# Patient Record
Sex: MALE | Race: BLACK OR AFRICAN AMERICAN | Employment: OTHER | URBAN - METROPOLITAN AREA
[De-identification: names, ages, dates, MRNs, and addresses within clinical notes are randomized per-mention and may not be internally consistent; named-entity substitution may affect disease eponyms.]

---

## 2022-04-01 ENCOUNTER — APPOINTMENT (OUTPATIENT)
Dept: GENERAL RADIOLOGY | Age: 87
End: 2022-04-01
Payer: COMMERCIAL

## 2022-04-01 ENCOUNTER — APPOINTMENT (OUTPATIENT)
Dept: CT IMAGING | Age: 87
End: 2022-04-01
Payer: COMMERCIAL

## 2022-04-01 ENCOUNTER — HOSPITAL ENCOUNTER (EMERGENCY)
Age: 87
Discharge: NURSING, REHAB OR LONG-TERM CARE | End: 2022-04-01
Attending: EMERGENCY MEDICINE
Payer: COMMERCIAL

## 2022-04-01 VITALS
HEART RATE: 58 BPM | SYSTOLIC BLOOD PRESSURE: 128 MMHG | DIASTOLIC BLOOD PRESSURE: 80 MMHG | TEMPERATURE: 98.3 F | OXYGEN SATURATION: 100 % | RESPIRATION RATE: 14 BRPM

## 2022-04-01 DIAGNOSIS — S09.90XA INJURY OF HEAD, INITIAL ENCOUNTER: Primary | ICD-10-CM
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
## 2022-04-01 PROCEDURE — 70450 CT HEAD/BRAIN W/O DYE: CPT

## 2022-04-01 PROCEDURE — 73502 X-RAY EXAM HIP UNI 2-3 VIEWS: CPT

## 2022-04-01 PROCEDURE — 99285 EMERGENCY DEPT VISIT HI MDM: CPT

## 2022-04-01 PROCEDURE — 72125 CT NECK SPINE W/O DYE: CPT

## 2022-04-01 PROCEDURE — 71045 X-RAY EXAM CHEST 1 VIEW: CPT

## 2022-04-01 PROCEDURE — 73030 X-RAY EXAM OF SHOULDER: CPT

## 2022-04-01 RX ORDER — DONEPEZIL HYDROCHLORIDE 10 MG/1
10 TABLET, FILM COATED ORAL NIGHTLY
COMMUNITY

## 2022-04-01 RX ORDER — SENNA PLUS 8.6 MG/1
1 TABLET ORAL DAILY PRN
COMMUNITY

## 2022-04-01 RX ORDER — SENNA PLUS 8.6 MG/1
2 TABLET ORAL DAILY PRN
Status: ON HOLD | COMMUNITY
End: 2022-07-12 | Stop reason: HOSPADM

## 2022-04-01 RX ORDER — CHOLECALCIFEROL (VITAMIN D3) 25 MCG
TABLET,CHEWABLE ORAL DAILY
COMMUNITY

## 2022-04-01 RX ORDER — M-VIT,TX,IRON,MINS/CALC/FOLIC 27MG-0.4MG
1 TABLET ORAL DAILY
COMMUNITY

## 2022-04-01 RX ORDER — SERTRALINE HYDROCHLORIDE 100 MG/1
100 TABLET, FILM COATED ORAL DAILY
COMMUNITY

## 2022-04-01 RX ORDER — LIDOCAINE 50 MG/G
OINTMENT TOPICAL EVERY 8 HOURS PRN
COMMUNITY

## 2022-04-01 RX ORDER — DOCUSATE SODIUM 100 MG/1
200 CAPSULE, LIQUID FILLED ORAL NIGHTLY
COMMUNITY

## 2022-04-01 RX ORDER — ACETAMINOPHEN 500 MG
500 TABLET ORAL NIGHTLY
COMMUNITY

## 2022-04-01 RX ORDER — ACETAMINOPHEN 325 MG/1
650 TABLET ORAL EVERY 6 HOURS PRN
COMMUNITY

## 2022-04-01 RX ORDER — TAMSULOSIN HYDROCHLORIDE 0.4 MG/1
0.4 CAPSULE ORAL DAILY
COMMUNITY

## 2022-04-01 RX ORDER — FUROSEMIDE 40 MG/1
40 TABLET ORAL DAILY
Status: ON HOLD | COMMUNITY
End: 2022-07-12 | Stop reason: HOSPADM

## 2022-04-01 RX ORDER — METOPROLOL SUCCINATE 25 MG/1
12.5 TABLET, EXTENDED RELEASE ORAL DAILY
Status: ON HOLD | COMMUNITY
End: 2022-07-12 | Stop reason: HOSPADM

## 2022-04-01 RX ORDER — OYSTER SHELL CALCIUM WITH VITAMIN D 500; 200 MG/1; [IU]/1
1 TABLET, FILM COATED ORAL 2 TIMES DAILY
COMMUNITY

## 2022-04-01 RX ORDER — SERTRALINE HYDROCHLORIDE 25 MG/1
25 TABLET, FILM COATED ORAL DAILY
Status: ON HOLD | COMMUNITY
End: 2022-07-12 | Stop reason: HOSPADM

## 2022-04-01 RX ORDER — FINASTERIDE 5 MG/1
5 TABLET, FILM COATED ORAL DAILY
COMMUNITY

## 2022-04-01 RX ORDER — ASPIRIN 81 MG/1
81 TABLET, CHEWABLE ORAL DAILY
COMMUNITY

## 2022-04-01 ASSESSMENT — PAIN DESCRIPTION - LOCATION: LOCATION: BUTTOCKS

## 2022-04-01 ASSESSMENT — ENCOUNTER SYMPTOMS
RHINORRHEA: 0
ABDOMINAL PAIN: 0
VOMITING: 0
COUGH: 0
SHORTNESS OF BREATH: 0
DIARRHEA: 0
NAUSEA: 0

## 2022-04-01 ASSESSMENT — PAIN - FUNCTIONAL ASSESSMENT: PAIN_FUNCTIONAL_ASSESSMENT: 0-10

## 2022-04-01 ASSESSMENT — PAIN DESCRIPTION - PAIN TYPE: TYPE: CHRONIC PAIN

## 2022-04-01 ASSESSMENT — PAIN SCALES - GENERAL: PAINLEVEL_OUTOF10: 10

## 2022-04-01 NOTE — ED PROVIDER NOTES
[facility] [facility]        Pt Name: Haylee Cortez  MRN: [id number]  [place] 6/27/1926  Date of evaluation: 4/1/2022  Provider: Jackson Carty PA-C  PCP: Jamel Issa MD  Note Started: 3:02 AM EDT        I have seen and evaluated this patient with my supervising physician Som Estrada MD.    [address] [place] [address]       Chief Complaint   Patient presents with   Bel Kang     From [facility] via EMS following unwitnessed mechanical fall while trying to reach call light. PT vomited afterwards, unsure how long down. C/O pain on bottom from pressure sores. Abrasions noted to right cheek and shoulder. Cried out when right leg moved per EMS, abrasions noted. Hx of dementia. HISTORY OF PRESENT ILLNESS   (Location, Timing/Onset, Context/Setting, Quality, Duration, Modifying Factors, Severity, Associated Signs and Symptoms)  Note limiting factors. Chief Complaint: Brooklynn De La Torre is a 80 y.o. male who presents to the emergency department today from [facility] facility for evaluation for a fall. The fall was unwitnessed. The patient states that around 10 PM, he states that he was reaching for his call light, however he states that his call he was on the floor, he states that this caused him to roll out of bed, and fell on his right side. The patient states that he did hit his head however there is no loss of consciousness. The patient states that he did have an episode of emesis after he fell, there was no bilious emesis, hematemesis or coffee-ground emesis. The patient denies any abdominal pain, nausea at this time. No diarrhea. The patient denies any symptoms prior to his fall he states he simply rolled out of bed trying to reach his call light. Patient is not on any blood thinners.   Patient is complaining of pain to his bottom but he states that this is from his pressure sores, he states that this is not a new pain.  He denies any hip pain, neck pain, or shoulder pain from the fall. He has no headaches. No numbness, tingling or weakness. Patient otherwise has no complaints or injuries. Nursing Notes were all reviewed and agreed with or any disagreements were addressed in the HPI. REVIEW OF SYSTEMS    (2-9 systems for level 4, 10 or more for level 5)     Review of Systems   Constitutional: Negative for activity change, appetite change, chills and fever. HENT: Negative for congestion and rhinorrhea. Respiratory: Negative for cough and shortness of breath. Cardiovascular: Negative for chest pain. Gastrointestinal: Negative for abdominal pain, diarrhea, nausea and vomiting. Genitourinary: Negative for difficulty urinating, dysuria and hematuria. Neurological: Negative for weakness and numbness. Positives and Pertinent negatives as per HPI. Except as noted above in the ROS, all other systems were reviewed and negative. PAST MEDICAL HISTORY     Past Medical History:   Diagnosis Date    Age-related physical debility     Anxiety disorder due to known physiological condition     Anxiety disorder, unspecified     Benign prostatic hyperplasia without lower urinary tract symptoms     Bradycardia, unspecified     Cardiomyopathy ([address]     Diabetes mellitus ([address]     Dizziness and giddiness     Dysphagia, oropharyngeal phase     Hyperlipidemia     Hypertension     Major depressive disorder, recurrent, moderate (HCC)     Mild cognitive impairment, so stated     Muscle weakness (generalized)     Unspecified dementia without behavioral disturbance (HCC)     Unspecified systolic (congestive) heart failure (HCC)     Unsteadiness on feet          SURGICAL HISTORY   History reviewed. No pertinent surgical history.       CURRENTMEDICATIONS       Previous Medications    ACETAMINOPHEN (TYLENOL) 325 MG TABLET    Take 650 mg by mouth every 6 hours as needed for Pain or Fever    ACETAMINOPHEN (TYLENOL) 500 MG TABLET    Take 500 mg by mouth nightly Indications: Nerve Disease    ASPIRIN 81 MG CHEWABLE TABLET    Take 81 mg by mouth daily    CALCIUM-VITAMIN D (OSCAL-500) 500-200 MG-UNIT PER TABLET    Take 1 tablet by mouth 2 times daily    CYANOCOBALAMIN (B-12) 1000 MCG CAPS    Take by mouth daily    DOCUSATE SODIUM (COLACE) 100 MG CAPSULE    Take 200 mg by mouth nightly Indications: Constipation    DONEPEZIL (ARICEPT) 10 MG TABLET    Take 10 mg by mouth nightly Indications: Decline in Cognition due to a Brain Disease    FEXOFENADINE (ALLEGRA) 30 MG DISSOLVABLE TABLET    Take 60 mg by mouth every 8 hours as needed (cough)    FINASTERIDE (PROSCAR) 5 MG TABLET    Take 5 mg by mouth daily    FUROSEMIDE (LASIX) 40 MG TABLET    Take 40 mg by mouth daily    LIDOCAINE (XYLOCAINE) 5 % OINTMENT    Apply topically every 8 hours as needed for Pain Apply to feet/toes as needed.     METOPROLOL SUCCINATE (TOPROL XL) 25 MG EXTENDED RELEASE TABLET    Take 12.5 mg by mouth daily Hold for SBP <110 or HR <60    MINERAL OIL-HYDROPHILIC PETROLATUM (AQUAPHOR) OINTMENT    Apply topically nightly Apply to feet and legs topically at bedtime    MULTIPLE VITAMINS-MINERALS (THERAPEUTIC MULTIVITAMIN-MINERALS) TABLET    Take 1 tablet by mouth daily    POLYETHYL GLYCOL-PROPYL GLYCOL 0.4-0.3 % (SYSTANE) 0.4-0.3 % OPHTHALMIC SOLUTION    1 drop 4 times daily Instill one drop in both eyes daily    SENNA (SENOKOT) 8.6 MG TABLET    Take 1 tablet by mouth daily as needed for Constipation    SENNA (SENOKOT) 8.6 MG TABLET    Take 2 tablets by mouth daily as needed for Constipation    SERTRALINE (ZOLOFT) 100 MG TABLET    Take 100 mg by mouth daily    SERTRALINE (ZOLOFT) 25 MG TABLET    Take 25 mg by mouth daily    SITAGLIPTIN (JANUVIA) 25 MG TABLET    Take 25 mg by mouth daily    TAMSULOSIN (FLOMAX) 0.4 MG CAPSULE    Take 0.4 mg by mouth daily    VITAMIN D (CHOLECALCIFEROL) 25 MCG (1000 UT) TABS TABLET    Take 1,000 Units by mouth daily ALLERGIES     Amitriptyline, Gabapentin, Mercurochrome [merbromin], Mercury, and Merthiolate glycerite [thimerosal]    FAMILYHISTORY     History reviewed. No pertinent family history. SOCIAL HISTORY       Social History     Tobacco Use    Smoking status: Unknown If Ever Smoked    Smokeless tobacco: Not on file   Vaping Use    Vaping Use: Never used   Substance Use Topics    Alcohol use: Not Currently    Drug use: Not Currently       SCREENINGS    Joselin Coma Scale  Eye Opening: Spontaneous  Best Verbal Response: Oriented  Best Motor Response: Obeys commands  Joselin Coma Scale Score: 15        PHYSICAL EXAM    (up to 7 for level 4, 8 or more for level 5)     ED Triage Vitals [04/01/22 0227]   BP Temp Temp Source Pulse Resp SpO2 Height Weight   113/82 98.3 °F (36.8 °C) Oral 62 22 98 % -- --       Physical Exam  Vitals and nursing note reviewed. Constitutional:       Appearance: He is well-developed. He is not diaphoretic. HENT:      Head: Normocephalic and atraumatic. Right Ear: External ear normal.      Left Ear: External ear normal.      Nose: Nose normal.   Eyes:      General:         Right eye: No discharge. Left eye: No discharge. Neck:      Trachea: No tracheal deviation. Cardiovascular:      Rate and Rhythm: Normal rate and regular rhythm. Pulmonary:      Effort: Pulmonary effort is normal. No respiratory distress. Breath sounds: Normal breath sounds. No wheezing or rales. Abdominal:      General: Bowel sounds are normal. There is no distension. Palpations: Abdomen is soft. Tenderness: There is no abdominal tenderness. There is no guarding. Musculoskeletal:         General: Normal range of motion. Cervical back: Normal range of motion and neck supple. Comments: There is no midline spinal tenderness    There is abrasion noted to the right shoulder. The patient has no reproducible tenderness noted over the right shoulder.   Swelling to fall which occurred around 10 PM.  He states that he was trying to get out of bed, and rolled out of bed, which is currently. He did hit his head. There is no loss of consciousness, but he did have an episode of emesis    On examination, no focal neurological deficits    CT of head and cervical spine are obtained and negative    Patient has no reproducible tenderness noted over the right shoulder however he does have an abrasion, x-ray is pending    Patient has no reproducible tenderness noted to the right hip related pain on the right side, will obtain x-ray*pending. This marks in my shift, please see attending note for details and final disposition      FINAL IMPRESSION      1. Injury of head, initial encounter    2. Fall, initial encounter          DISPOSITION/PLAN   DISPOSITION        PATIENT REFERRED TO:  No follow-up provider specified.     DISCHARGE MEDICATIONS:  New Prescriptions    No medications on file       DISCONTINUED MEDICATIONS:  Discontinued Medications    No medications on file              (Please note that portions of this note were completed with a voice recognition program.  Efforts were made to edit the dictations but occasionally words are mis-transcribed.)    Deborah Dill PA-C (electronically signed)            Deborah Dill PA-C  04/01/22 1722

## 2022-04-01 NOTE — ED PROVIDER NOTES
[facility]    Physician Attestation    Pt Name: Janey Deleon  MRN: [id number]  [place] 6/27/1926  Date of evaluation: 4/1/22        Physician Note:    I havepersonally performed and/or participated in the history, exam and medical decision making and agree with all pertinent clinical information. I have also reviewed and agree with the past medical, family and social historyunless otherwise noted. I have personally performed a face to face diagnostic evaluation onthis patient. I have reviewed the mid-levels findings and agree. History: rolled out of bed landing on his right side has no complaints at this time he is hard of hearing      REVIEW OF SYSTEMS    Constitutional:  Denies fever, chills, or weakness   Eyes:  Denies vision changes  HENT:  Denies sore throat or neck pain   Respiratory:  Denies cough or shortness of breath   Cardiovascular:  Denies chest pain  GI:  Denies abdominal pain, nausea, vomiting, or diarrhea   Musculoskeletal:  Denies back pain   Skin: no rash or vesicles   Neurologic:  no headache weakness focal    Lymphatic:  no swollen  nodes   Psychiatric: no si or hs thoughts     All systems negative except as marked. General Appearance:  Alert, cooperative, no distress, appears stated age. Head:  Normocephalic, without obvious abnormality, atraumatic. Eyes:  conjunctiva/corneas clear, EOM's intact. Sclera anicteric. ENT: Mucous membranes moist.   Neck: Supple, symmetrical, trachea midline, no adenopathy. No jugular venous distention. Lungs:   No Respiratory Distress. no rales  rhonchi rub   Chest Wall:  Nontender  no deformity   Heart:  Rsr no murmer gallop    Abdomen:   Soft nontender no organomegally    Extremities:  Full range of motion. no deformity   Rt shoulder has good range of motion slightly larger than the left   Pulses: Equal  upper and lower    Skin:  No rashes or lesions to exposed skin. Ointment on the sacral region but no breakdown of the skin is appreciated   Neurologic: Alert and oriented X 3. Motor grossly normal.  Speech clear. CT of the head and neck unremarkable x-rays of the right shoulder show degenerative changes x-ray of the hip shows degenerative changes chest x-ray unremarkable patient will be discharged back to the nursing home    1. Injury of head, initial encounter    2.  Fall, initial encounter          DISPOSITION/PLAN  PATIENT REFERRED TO:  Ardena Moritz, MD  [address] [place] [address] Road [address]  [phone]          DISCHARGE MEDICATIONS:  New Prescriptions    No medications on file         MD Veena Stevenson MD  04/01/22 1559

## 2022-07-07 ENCOUNTER — HOSPITAL ENCOUNTER (INPATIENT)
Age: 87
LOS: 5 days | Discharge: NURSING, REHAB OR LONG-TERM CARE | DRG: 682 | End: 2022-07-12
Attending: EMERGENCY MEDICINE | Admitting: INTERNAL MEDICINE
Payer: COMMERCIAL

## 2022-07-07 ENCOUNTER — APPOINTMENT (OUTPATIENT)
Dept: GENERAL RADIOLOGY | Age: 87
DRG: 682 | End: 2022-07-07
Payer: COMMERCIAL

## 2022-07-07 ENCOUNTER — APPOINTMENT (OUTPATIENT)
Dept: CT IMAGING | Age: 87
DRG: 682 | End: 2022-07-07
Payer: COMMERCIAL

## 2022-07-07 DIAGNOSIS — N17.9 AKI (ACUTE KIDNEY INJURY) (HCC): ICD-10-CM

## 2022-07-07 DIAGNOSIS — R77.8 ELEVATED TROPONIN: ICD-10-CM

## 2022-07-07 DIAGNOSIS — E86.0 DEHYDRATION: Primary | ICD-10-CM

## 2022-07-07 DIAGNOSIS — G89.29 OTHER CHRONIC PAIN: ICD-10-CM

## 2022-07-07 DIAGNOSIS — K59.00 CONSTIPATION, UNSPECIFIED CONSTIPATION TYPE: ICD-10-CM

## 2022-07-07 PROBLEM — Z99.2 ACUTE RENAL FAILURE SUPERIMPOSED ON CHRONIC KIDNEY DISEASE, ON CHRONIC DIALYSIS (HCC): Status: ACTIVE | Noted: 2022-07-07

## 2022-07-07 PROBLEM — N18.9 ACUTE RENAL FAILURE SUPERIMPOSED ON CHRONIC KIDNEY DISEASE, ON CHRONIC DIALYSIS (HCC): Status: ACTIVE | Noted: 2022-07-07

## 2022-07-07 LAB
A/G RATIO: 1 (ref 1.1–2.2)
ALBUMIN SERPL-MCNC: 4.1 G/DL (ref 3.4–5)
ALP BLD-CCNC: 52 U/L (ref 40–129)
ALT SERPL-CCNC: 9 U/L (ref 10–40)
ANION GAP SERPL CALCULATED.3IONS-SCNC: 19 MMOL/L (ref 3–16)
AST SERPL-CCNC: 24 U/L (ref 15–37)
BACTERIA: ABNORMAL /HPF
BASE EXCESS VENOUS: 2.7 MMOL/L (ref -3–3)
BASOPHILS ABSOLUTE: 0 K/UL (ref 0–0.2)
BASOPHILS RELATIVE PERCENT: 0.2 %
BILIRUB SERPL-MCNC: 0.7 MG/DL (ref 0–1)
BILIRUBIN URINE: NEGATIVE
BLOOD, URINE: NEGATIVE
BUN BLDV-MCNC: 90 MG/DL (ref 7–20)
CALCIUM SERPL-MCNC: 9.9 MG/DL (ref 8.3–10.6)
CARBOXYHEMOGLOBIN: 3.1 % (ref 0–1.5)
CHLORIDE BLD-SCNC: 102 MMOL/L (ref 99–110)
CLARITY: CLEAR
CO2: 27 MMOL/L (ref 21–32)
COLOR: YELLOW
CREAT SERPL-MCNC: 3.4 MG/DL (ref 0.8–1.3)
EOSINOPHILS ABSOLUTE: 0 K/UL (ref 0–0.6)
EOSINOPHILS RELATIVE PERCENT: 0.3 %
EPITHELIAL CELLS, UA: ABNORMAL /HPF (ref 0–5)
GFR AFRICAN AMERICAN: 20
GFR NON-AFRICAN AMERICAN: 17
GLUCOSE BLD-MCNC: 159 MG/DL (ref 70–99)
GLUCOSE URINE: NEGATIVE MG/DL
HCO3 VENOUS: 27.3 MMOL/L (ref 23–29)
HCT VFR BLD CALC: 41.2 % (ref 40.5–52.5)
HEMOGLOBIN: 13.1 G/DL (ref 13.5–17.5)
HYALINE CASTS: ABNORMAL /LPF (ref 0–2)
KETONES, URINE: ABNORMAL MG/DL
LACTIC ACID, SEPSIS: 2.4 MMOL/L (ref 0.4–1.9)
LACTIC ACID, SEPSIS: 2.5 MMOL/L (ref 0.4–1.9)
LEUKOCYTE ESTERASE, URINE: ABNORMAL
LYMPHOCYTES ABSOLUTE: 1.8 K/UL (ref 1–5.1)
LYMPHOCYTES RELATIVE PERCENT: 21.8 %
MCH RBC QN AUTO: 30.7 PG (ref 26–34)
MCHC RBC AUTO-ENTMCNC: 31.8 G/DL (ref 31–36)
MCV RBC AUTO: 96.6 FL (ref 80–100)
METHEMOGLOBIN VENOUS: 0.1 %
MICROSCOPIC EXAMINATION: YES
MONOCYTES ABSOLUTE: 0.8 K/UL (ref 0–1.3)
MONOCYTES RELATIVE PERCENT: 10 %
NEUTROPHILS ABSOLUTE: 5.7 K/UL (ref 1.7–7.7)
NEUTROPHILS RELATIVE PERCENT: 67.7 %
NITRITE, URINE: NEGATIVE
O2 CONTENT, VEN: 18 VOL %
O2 SAT, VEN: 99 %
O2 THERAPY: ABNORMAL
PCO2, VEN: 41.4 MMHG (ref 40–50)
PDW BLD-RTO: 15.9 % (ref 12.4–15.4)
PH UA: 5 (ref 5–8)
PH VENOUS: 7.43 (ref 7.35–7.45)
PLATELET # BLD: 116 K/UL (ref 135–450)
PMV BLD AUTO: 9 FL (ref 5–10.5)
PO2, VEN: 116 MMHG (ref 25–40)
POTASSIUM REFLEX MAGNESIUM: 4.7 MMOL/L (ref 3.5–5.1)
PROTEIN UA: ABNORMAL MG/DL
RBC # BLD: 4.27 M/UL (ref 4.2–5.9)
RBC UA: ABNORMAL /HPF (ref 0–4)
SODIUM BLD-SCNC: 148 MMOL/L (ref 136–145)
SPECIFIC GRAVITY UA: 1.01 (ref 1–1.03)
TCO2 CALC VENOUS: 64 MMOL/L
TOTAL PROTEIN: 8.4 G/DL (ref 6.4–8.2)
TROPONIN: 0.22 NG/ML
TROPONIN: 0.3 NG/ML
URINE REFLEX TO CULTURE: ABNORMAL
URINE TYPE: ABNORMAL
UROBILINOGEN, URINE: 0.2 E.U./DL
WBC # BLD: 8.4 K/UL (ref 4–11)
WBC UA: ABNORMAL /HPF (ref 0–5)

## 2022-07-07 PROCEDURE — 93005 ELECTROCARDIOGRAM TRACING: CPT | Performed by: EMERGENCY MEDICINE

## 2022-07-07 PROCEDURE — 6360000002 HC RX W HCPCS: Performed by: INTERNAL MEDICINE

## 2022-07-07 PROCEDURE — 80053 COMPREHEN METABOLIC PANEL: CPT

## 2022-07-07 PROCEDURE — 99285 EMERGENCY DEPT VISIT HI MDM: CPT

## 2022-07-07 PROCEDURE — 6360000002 HC RX W HCPCS: Performed by: EMERGENCY MEDICINE

## 2022-07-07 PROCEDURE — 2580000003 HC RX 258: Performed by: INTERNAL MEDICINE

## 2022-07-07 PROCEDURE — 96375 TX/PRO/DX INJ NEW DRUG ADDON: CPT

## 2022-07-07 PROCEDURE — 85025 COMPLETE CBC W/AUTO DIFF WBC: CPT

## 2022-07-07 PROCEDURE — 74176 CT ABD & PELVIS W/O CONTRAST: CPT

## 2022-07-07 PROCEDURE — 81001 URINALYSIS AUTO W/SCOPE: CPT

## 2022-07-07 PROCEDURE — 84153 ASSAY OF PSA TOTAL: CPT

## 2022-07-07 PROCEDURE — 70450 CT HEAD/BRAIN W/O DYE: CPT

## 2022-07-07 PROCEDURE — 2580000003 HC RX 258: Performed by: EMERGENCY MEDICINE

## 2022-07-07 PROCEDURE — 1200000000 HC SEMI PRIVATE

## 2022-07-07 PROCEDURE — 84484 ASSAY OF TROPONIN QUANT: CPT

## 2022-07-07 PROCEDURE — 82803 BLOOD GASES ANY COMBINATION: CPT

## 2022-07-07 PROCEDURE — 6370000000 HC RX 637 (ALT 250 FOR IP): Performed by: INTERNAL MEDICINE

## 2022-07-07 PROCEDURE — 71045 X-RAY EXAM CHEST 1 VIEW: CPT

## 2022-07-07 PROCEDURE — 96360 HYDRATION IV INFUSION INIT: CPT

## 2022-07-07 PROCEDURE — 96374 THER/PROPH/DIAG INJ IV PUSH: CPT

## 2022-07-07 PROCEDURE — 96361 HYDRATE IV INFUSION ADD-ON: CPT

## 2022-07-07 PROCEDURE — 83605 ASSAY OF LACTIC ACID: CPT

## 2022-07-07 RX ORDER — VITAMIN B COMPLEX
1000 TABLET ORAL DAILY
Status: DISCONTINUED | OUTPATIENT
Start: 2022-07-07 | End: 2022-07-12 | Stop reason: HOSPADM

## 2022-07-07 RX ORDER — FINASTERIDE 5 MG/1
5 TABLET, FILM COATED ORAL DAILY
Status: DISCONTINUED | OUTPATIENT
Start: 2022-07-07 | End: 2022-07-12 | Stop reason: HOSPADM

## 2022-07-07 RX ORDER — ACETAMINOPHEN 325 MG/1
650 TABLET ORAL EVERY 6 HOURS PRN
Status: DISCONTINUED | OUTPATIENT
Start: 2022-07-07 | End: 2022-07-12 | Stop reason: HOSPADM

## 2022-07-07 RX ORDER — POLYVINYL ALCOHOL 14 MG/ML
1 SOLUTION/ DROPS OPHTHALMIC 4 TIMES DAILY
Status: DISCONTINUED | OUTPATIENT
Start: 2022-07-07 | End: 2022-07-12 | Stop reason: HOSPADM

## 2022-07-07 RX ORDER — TAMSULOSIN HYDROCHLORIDE 0.4 MG/1
0.4 CAPSULE ORAL DAILY
Status: DISCONTINUED | OUTPATIENT
Start: 2022-07-07 | End: 2022-07-12 | Stop reason: HOSPADM

## 2022-07-07 RX ORDER — CETIRIZINE HYDROCHLORIDE 10 MG/1
5 TABLET ORAL DAILY
Status: DISCONTINUED | OUTPATIENT
Start: 2022-07-07 | End: 2022-07-11

## 2022-07-07 RX ORDER — M-VIT,TX,IRON,MINS/CALC/FOLIC 27MG-0.4MG
1 TABLET ORAL DAILY
Status: DISCONTINUED | OUTPATIENT
Start: 2022-07-07 | End: 2022-07-12 | Stop reason: HOSPADM

## 2022-07-07 RX ORDER — MIDAZOLAM HYDROCHLORIDE 2 MG/2ML
1 INJECTION, SOLUTION INTRAMUSCULAR; INTRAVENOUS ONCE
Status: COMPLETED | OUTPATIENT
Start: 2022-07-07 | End: 2022-07-07

## 2022-07-07 RX ORDER — SODIUM CHLORIDE, SODIUM LACTATE, POTASSIUM CHLORIDE, AND CALCIUM CHLORIDE .6; .31; .03; .02 G/100ML; G/100ML; G/100ML; G/100ML
1000 INJECTION, SOLUTION INTRAVENOUS ONCE
Status: COMPLETED | OUTPATIENT
Start: 2022-07-07 | End: 2022-07-07

## 2022-07-07 RX ORDER — OYSTER SHELL CALCIUM WITH VITAMIN D 500; 200 MG/1; [IU]/1
1 TABLET, FILM COATED ORAL 2 TIMES DAILY
Status: DISCONTINUED | OUTPATIENT
Start: 2022-07-07 | End: 2022-07-09

## 2022-07-07 RX ORDER — LANOLIN ALCOHOL/MO/W.PET/CERES
1000 CREAM (GRAM) TOPICAL DAILY
Status: DISCONTINUED | OUTPATIENT
Start: 2022-07-07 | End: 2022-07-12 | Stop reason: HOSPADM

## 2022-07-07 RX ORDER — ASPIRIN 81 MG/1
81 TABLET, CHEWABLE ORAL DAILY
Status: DISCONTINUED | OUTPATIENT
Start: 2022-07-07 | End: 2022-07-12 | Stop reason: HOSPADM

## 2022-07-07 RX ORDER — ACETAMINOPHEN 500 MG
500 TABLET ORAL NIGHTLY
Status: DISCONTINUED | OUTPATIENT
Start: 2022-07-07 | End: 2022-07-12 | Stop reason: HOSPADM

## 2022-07-07 RX ORDER — SENNA PLUS 8.6 MG/1
1 TABLET ORAL NIGHTLY
Status: DISCONTINUED | OUTPATIENT
Start: 2022-07-07 | End: 2022-07-12 | Stop reason: HOSPADM

## 2022-07-07 RX ORDER — ALOGLIPTIN 6.25 MG/1
6.25 TABLET, FILM COATED ORAL DAILY
Status: DISCONTINUED | OUTPATIENT
Start: 2022-07-07 | End: 2022-07-12 | Stop reason: HOSPADM

## 2022-07-07 RX ORDER — HALOPERIDOL 5 MG/ML
2 INJECTION INTRAMUSCULAR ONCE
Status: COMPLETED | OUTPATIENT
Start: 2022-07-07 | End: 2022-07-07

## 2022-07-07 RX ORDER — DONEPEZIL HYDROCHLORIDE 5 MG/1
10 TABLET, FILM COATED ORAL NIGHTLY
Status: DISCONTINUED | OUTPATIENT
Start: 2022-07-07 | End: 2022-07-12 | Stop reason: HOSPADM

## 2022-07-07 RX ORDER — SKIN PROTECTANT 44 G/100G
OINTMENT TOPICAL NIGHTLY
Status: DISCONTINUED | OUTPATIENT
Start: 2022-07-07 | End: 2022-07-12 | Stop reason: HOSPADM

## 2022-07-07 RX ORDER — DOCUSATE SODIUM 100 MG/1
200 CAPSULE, LIQUID FILLED ORAL NIGHTLY
Status: DISCONTINUED | OUTPATIENT
Start: 2022-07-07 | End: 2022-07-12 | Stop reason: HOSPADM

## 2022-07-07 RX ORDER — LIDOCAINE 50 MG/G
OINTMENT TOPICAL EVERY 8 HOURS PRN
Status: DISCONTINUED | OUTPATIENT
Start: 2022-07-07 | End: 2022-07-12 | Stop reason: HOSPADM

## 2022-07-07 RX ORDER — HEPARIN SODIUM 5000 [USP'U]/ML
5000 INJECTION, SOLUTION INTRAVENOUS; SUBCUTANEOUS EVERY 8 HOURS SCHEDULED
Status: DISCONTINUED | OUTPATIENT
Start: 2022-07-07 | End: 2022-07-12 | Stop reason: HOSPADM

## 2022-07-07 RX ORDER — DEXTROSE AND SODIUM CHLORIDE 5; .45 G/100ML; G/100ML
INJECTION, SOLUTION INTRAVENOUS CONTINUOUS
Status: DISCONTINUED | OUTPATIENT
Start: 2022-07-07 | End: 2022-07-08

## 2022-07-07 RX ORDER — METOPROLOL SUCCINATE 25 MG/1
12.5 TABLET, EXTENDED RELEASE ORAL DAILY
Status: DISCONTINUED | OUTPATIENT
Start: 2022-07-07 | End: 2022-07-11

## 2022-07-07 RX ADMIN — HALOPERIDOL LACTATE 2 MG: 5 INJECTION, SOLUTION INTRAMUSCULAR at 14:40

## 2022-07-07 RX ADMIN — SODIUM CHLORIDE, POTASSIUM CHLORIDE, SODIUM LACTATE AND CALCIUM CHLORIDE 1000 ML: 600; 310; 30; 20 INJECTION, SOLUTION INTRAVENOUS at 08:03

## 2022-07-07 RX ADMIN — MIDAZOLAM HYDROCHLORIDE 1 MG: 1 INJECTION, SOLUTION INTRAMUSCULAR; INTRAVENOUS at 14:41

## 2022-07-07 RX ADMIN — HEPARIN SODIUM 5000 UNITS: 5000 INJECTION INTRAVENOUS; SUBCUTANEOUS at 20:49

## 2022-07-07 RX ADMIN — POLYVINYL ALCOHOL 1 DROP: 14 SOLUTION/ DROPS OPHTHALMIC at 20:51

## 2022-07-07 RX ADMIN — SODIUM CHLORIDE, POTASSIUM CHLORIDE, SODIUM LACTATE AND CALCIUM CHLORIDE 1000 ML: 600; 310; 30; 20 INJECTION, SOLUTION INTRAVENOUS at 10:43

## 2022-07-07 RX ADMIN — DEXTROSE AND SODIUM CHLORIDE: 5; 450 INJECTION, SOLUTION INTRAVENOUS at 15:47

## 2022-07-07 ASSESSMENT — PAIN - FUNCTIONAL ASSESSMENT: PAIN_FUNCTIONAL_ASSESSMENT: NONE - DENIES PAIN

## 2022-07-07 ASSESSMENT — PAIN SCALES - GENERAL
PAINLEVEL_OUTOF10: 0

## 2022-07-07 ASSESSMENT — PAIN SCALES - WONG BAKER
WONGBAKER_NUMERICALRESPONSE: 0
WONGBAKER_NUMERICALRESPONSE: 0

## 2022-07-07 NOTE — PLAN OF CARE
Problem: Skin/Tissue Integrity  Goal: Absence of new skin breakdown  Description: 1. Monitor for areas of redness and/or skin breakdown  2. Assess vascular access sites hourly  3. Every 4-6 hours minimum:  Change oxygen saturation probe site  4. Every 4-6 hours:  If on nasal continuous positive airway pressure, respiratory therapy assess nares and determine need for appliance change or resting period.   Outcome: Progressing     Problem: Pain  Goal: Verbalizes/displays adequate comfort level or baseline comfort level  Outcome: Progressing     Problem: Safety - Adult  Goal: Free from fall injury  Outcome: Progressing     Problem: ABCDS Injury Assessment  Goal: Absence of physical injury  Outcome: Progressing

## 2022-07-07 NOTE — ED NOTES
Open wound on coccyx. Surrounded area non-blanchable.  Mepliex in place     [place], [address] [place] [address]  07/07/22 9053

## 2022-07-07 NOTE — ED NOTES
Pt became agitated. Took off his pulse oximeter. Pt screaming \"I cant pee for hours\" (pt has [place]). Pt's wife at bedside. MD notified of pt condition.  Ordered medications sarita Daly RN  07/07/22 7257

## 2022-07-07 NOTE — ED PROVIDER NOTES
[facility] PROVIDER NOTE    Patient Identification  Pt Name: Richelle Isbell  MRN: [id number]  [place] 6/27/1926  Date of evaluation: 7/7/2022  Provider: Gilles Sharp MD  PCP: Tino Blandon MD    Chief Complaint  Other (Pt srrived via [facility] from [facility] home w c/o abnormal labs.)      HPI  History provided by patient   This is a 80 y.o. male who presents to the ED for not eating for the past 4 days. He is weaker than normal.  Blood work was performed yesterday and showed PAMELA and hypernatremia therefore he was sent here. Patient has pain over his buttocks which he attributes to bedsore. He mainly complains of dry mouth. No pain anywhere else. No chest pain. No shortness of breath. History seems limited due to clinical condition. ROS  12 systems reviewed, pertinent positives/negatives per HPI otherwise noted to be negative. I have reviewed the following nursing documentation:  Allergies: Amitriptyline, Gabapentin, Mercurochrome [merbromin], Mercury, and Merthiolate glycerite [thimerosal]    Past medical history:   Past Medical History:   Diagnosis Date    Age-related physical debility     Anxiety disorder due to known physiological condition     Anxiety disorder, unspecified     Benign prostatic hyperplasia without lower urinary tract symptoms     Bradycardia, unspecified     Cardiomyopathy ([address]     Diabetes mellitus ([address]     Dizziness and giddiness     Dysphagia, oropharyngeal phase     Hyperlipidemia     Hypertension     Major depressive disorder, recurrent, moderate (HCC)     Mild cognitive impairment, so stated     Muscle weakness (generalized)     Unspecified dementia without behavioral disturbance (HCC)     Unspecified systolic (congestive) heart failure (HCC)     Unsteadiness on feet      Past surgical history: No past surgical history on file.     Home medications:   Previous Medications    ACETAMINOPHEN (TYLENOL) 325 MG TABLET    Take 650 mg by mouth every 6 hours as needed for Pain or Fever    ACETAMINOPHEN (TYLENOL) 500 MG TABLET    Take 500 mg by mouth nightly Indications: Nerve Disease    ASPIRIN 81 MG CHEWABLE TABLET    Take 81 mg by mouth daily    CALCIUM-VITAMIN D (OSCAL-500) 500-200 MG-UNIT PER TABLET    Take 1 tablet by mouth 2 times daily    CYANOCOBALAMIN (B-12) 1000 MCG CAPS    Take by mouth daily    DOCUSATE SODIUM (COLACE) 100 MG CAPSULE    Take 200 mg by mouth nightly Indications: Constipation    DONEPEZIL (ARICEPT) 10 MG TABLET    Take 10 mg by mouth nightly Indications: Decline in Cognition due to a Brain Disease    FEXOFENADINE (ALLEGRA) 30 MG DISSOLVABLE TABLET    Take 60 mg by mouth every 8 hours as needed (cough)    FINASTERIDE (PROSCAR) 5 MG TABLET    Take 5 mg by mouth daily    FUROSEMIDE (LASIX) 40 MG TABLET    Take 40 mg by mouth daily    LIDOCAINE (XYLOCAINE) 5 % OINTMENT    Apply topically every 8 hours as needed for Pain Apply to feet/toes as needed.     METOPROLOL SUCCINATE (TOPROL XL) 25 MG EXTENDED RELEASE TABLET    Take 12.5 mg by mouth daily Hold for SBP <110 or HR <60    MINERAL OIL-HYDROPHILIC PETROLATUM (AQUAPHOR) OINTMENT    Apply topically nightly Apply to feet and legs topically at bedtime    MULTIPLE VITAMINS-MINERALS (THERAPEUTIC MULTIVITAMIN-MINERALS) TABLET    Take 1 tablet by mouth daily    POLYETHYL GLYCOL-PROPYL GLYCOL 0.4-0.3 % (SYSTANE) 0.4-0.3 % OPHTHALMIC SOLUTION    1 drop 4 times daily Instill one drop in both eyes daily    SENNA (SENOKOT) 8.6 MG TABLET    Take 1 tablet by mouth daily as needed for Constipation    SENNA (SENOKOT) 8.6 MG TABLET    Take 2 tablets by mouth daily as needed for Constipation    SERTRALINE (ZOLOFT) 100 MG TABLET    Take 100 mg by mouth daily    SERTRALINE (ZOLOFT) 25 MG TABLET    Take 25 mg by mouth daily    SITAGLIPTIN (JANUVIA) 25 MG TABLET    Take 25 mg by mouth daily    TAMSULOSIN (FLOMAX) 0.4 MG CAPSULE    Take 0.4 mg by mouth daily    VITAMIN D (CHOLECALCIFEROL) 25 MCG (1000 UT) TABS TABLET    Take 1,000 Units by mouth daily       Social history:  reports previous alcohol use. He reports previous drug use. Family history:  No family history on file. Exam  ED Triage Vitals [07/07/22 0724]   BP Temp Temp src Heart Rate Resp SpO2 Height Weight   95/69 98.1 °F (36.7 °C) -- (!) 121 14 97 % -- --     Nursing note and vitals reviewed. Constitutional: In no acute distress  HENT:      Head: Normocephalic      Ears: External ears normal.      Nose: Nose normal.     Mouth: Membrane mucosa very dry  Eyes: No discharge. Neck: Supple. Trachea midline. Cardiovascular: Regular rate. Warm extremities  Pulmonary/Chest: Effort normal. No respiratory distress. Abdominal: Soft. No distension. Nontender  : Deferred  Rectal: Deferred   Musculoskeletal: Moves all extremities. No gross deformity. Neurological: Alert and oriented to person. Face symmetric. Speech is clear. Skin: Warm and dry. Psychiatric: Normal mood and affect. Behavior is normal.    Procedures      EKG  The Ekg interpreted by me in the absence of a cardiologist shows. Narrow qrs  Regular  No p waves  Possible junctional rhythm. No specific ST changes appreciated.   No prior EKG for comparison      Radiology  CT ABDOMEN PELVIS WO CONTRAST Additional Contrast? None   Final Result   No evidence of obstructive uropathy. Large stool ball at the rectum; correlate for fecal impaction. Nodular contour of the liver surface; correlate for cirrhosis. Gallbladder sludge. CT HEAD WO CONTRAST   Final Result   Atrophy and moderate small vessel ischemic disease. RECOMMENDATIONS:   Unavailable         XR CHEST PORTABLE   Final Result   No acute airspace disease.              Labs  Results for orders placed or performed during the hospital encounter of 07/07/22   CBC with Auto Differential   Result Value Ref Range    WBC 8.4 4.0 - 11.0 K/uL    RBC 4.27 4.20 - 5.90 M/uL Hemoglobin 13.1 (L) 13.5 - 17.5 g/dL    Hematocrit 41.2 40.5 - 52.5 %    MCV 96.6 80.0 - 100.0 fL    MCH 30.7 26.0 - 34.0 pg    MCHC 31.8 31.0 - 36.0 g/dL    RDW 15.9 (H) 12.4 - 15.4 %    Platelets 983 (L) 386 - 450 K/uL    MPV 9.0 5.0 - 10.5 fL    Neutrophils % 67.7 %    Lymphocytes % 21.8 %    Monocytes % 10.0 %    Eosinophils % 0.3 %    Basophils % 0.2 %    Neutrophils Absolute 5.7 1.7 - 7.7 K/uL    Lymphocytes Absolute 1.8 1.0 - 5.1 K/uL    Monocytes Absolute 0.8 0.0 - 1.3 K/uL    Eosinophils Absolute 0.0 0.0 - 0.6 K/uL    Basophils Absolute 0.0 0.0 - 0.2 K/uL   Comprehensive Metabolic Panel w/ Reflex to MG   Result Value Ref Range    Sodium 148 (H) 136 - 145 mmol/L    Potassium reflex Magnesium 4.7 3.5 - 5.1 mmol/L    Chloride 102 99 - 110 mmol/L    CO2 27 21 - 32 mmol/L    Anion Gap 19 (H) 3 - 16    Glucose 159 (H) 70 - 99 mg/dL    BUN 90 (HH) 7 - 20 mg/dL    CREATININE 3.4 (H) 0.8 - 1.3 mg/dL    GFR Non-African American 17 (A) >60    GFR  20 (A) >60    Calcium 9.9 8.3 - 10.6 mg/dL    Total Protein 8.4 (H) 6.4 - 8.2 g/dL    Albumin 4.1 3.4 - 5.0 g/dL    Albumin/Globulin Ratio 1.0 (L) 1.1 - 2.2    Total Bilirubin 0.7 0.0 - 1.0 mg/dL    Alkaline Phosphatase 52 40 - 129 U/L    ALT 9 (L) 10 - 40 U/L    AST 24 15 - 37 U/L   Troponin   Result Value Ref Range    Troponin 0.30 (H) <0.01 ng/mL   Urinalysis with Reflex to Culture    Specimen: Urine   Result Value Ref Range    Color, UA Yellow Straw/Yellow    Clarity, UA Clear Clear    Glucose, Ur Negative Negative mg/dL    Bilirubin Urine Negative Negative    Ketones, Urine TRACE (A) Negative mg/dL    Specific Gravity, UA 1.015 1.005 - 1.030    Blood, Urine Negative Negative    pH, UA 5.0 5.0 - 8.0    Protein, UA TRACE (A) Negative mg/dL    Urobilinogen, Urine 0.2 <2.0 E.U./dL    Nitrite, Urine Negative Negative    Leukocyte Esterase, Urine TRACE (A) Negative    Microscopic Examination YES     Urine Type NotGiven     Urine Reflex to Culture Not Indicated Lactate, Sepsis   Result Value Ref Range    Lactic Acid, Sepsis 2.4 (H) 0.4 - 1.9 mmol/L   Lactate, Sepsis   Result Value Ref Range    Lactic Acid, Sepsis 2.5 (H) 0.4 - 1.9 mmol/L   Blood Gas, Venous   Result Value Ref Range    pH, Pat[place] 7.428 7.350 - 7.450    pCO2, Pat[place] 41.4 40.0 - 50.0 mmHg    pO2, Pat[place] 116.0 (H) 25.0 - 40.0 mmHg    HCO3, Venous 27.3 23.0 - 29.0 mmol/L    Base Excess, Pato 2.7 -3.0 - 3.0 mmol/L    O2 Sat, Pat[place] 99 Not Established %    Carboxyhemoglobin 3.1 (H) 0.0 - 1.5 %    MetHgb, Pato 0.1 <1.5 %    TC02 (Calc), Pato 64 Not Established mmol/L    O2 Content, Pato 18 Not Established VOL %    O2 Therapy Unknown    Microscopic Urinalysis   Result Value Ref Range    Hyaline Casts, UA 3-5 (A) 0 - 2 /LPF    WBC, UA 0-2 0 - 5 /HPF    RBC, UA None seen 0 - 4 /HPF    Epithelial Cells, UA 2-5 0 - 5 /HPF    Bacteria, UA None Seen None Seen /HPF   Troponin   Result Value Ref Range    Troponin 0.22 (H) <0.01 ng/mL   EKG 12 Lead   Result Value Ref Range    Ventricular Rate 119 BPM    Atrial Rate 93 BPM    QRS Duration 106 ms    Q-T Interval 344 ms    QTc Calculation (Bazett) 483 ms    R Axis -66 degrees    T Axis 95 degrees    Diagnosis       Accelerated Junctional rhythm with retrograde conductionLeft axis deviationLow voltage QRSInferior infarct , age undeterminedCannot rule out Anterior infarct , age undeterminedAbnormal ECG       Screenings   Joselin Coma Scale  Eye Opening: Spontaneous  Best Verbal Response: Confused  Best Motor Response: Obeys commands  Joselin Coma Scale Score: 14       MDM and ED Course  This is a 80 y.o. male who presents to the ED for generalized weakness, not eating or drinking. Very dehydrated on exam.  Will give IV fluids. He is tachycardic. Outside lab work shows PAMELA with hypernatremia. Likely prerenal.  No abdominal pain or tenderness to indicate obstructive nephropathy. Will check for UTI.   Anticipate admission to hospitalist service.    ------------    No signs of infection found.  Did find PAMELA. BUN also elevated. Also has elevated troponin. May be secondary to PAMELA. I repeated this and troponin is downtrending. He has no chest pain or shortness of breath. No acute ischemic changes on EKG. CT shows evidence of constipation. He has no abdominal pain    -------------    Discussed with Dr. Pamella Urbano. Patient admitted. [unfilled]    Is this patient to be included in the SEP-1 Core Measure due to severe sepsis or septic shock? No   Exclusion criteria - the patient is NOT to be included for SEP-1 Core Measure due to: Infection is not suspected        Final Impression  1. Dehydration    2. PAMELA (acute kidney injury) ([facility]    3. Elevated troponin    4. Constipation, unspecified constipation type        Blood pressure 102/75, pulse (!) 119, temperature 98.1 °F (36.7 °C), resp. rate 18, SpO2 100 %. Disposition:  DISPOSITION        Patient Referrals:  No follow-up provider specified. Discharge Medications:  New Prescriptions    No medications on file       Discontinued Medications:  Discontinued Medications    No medications on file       This chart was generated using the [address] [place] [address] dictation system. I created this record but it may contain dictation errors given the limitations of this technology.         Mora Gutierrez MD  07/07/22 0609

## 2022-07-07 NOTE — ED NOTES
Pt oral mucous membrane exteremly dry with orange patches. Teach coated in thick decay. Oral care provided.      Margoth Mcgarth RN  07/07/22 1257

## 2022-07-08 ENCOUNTER — APPOINTMENT (OUTPATIENT)
Dept: ULTRASOUND IMAGING | Age: 87
DRG: 682 | End: 2022-07-08
Payer: COMMERCIAL

## 2022-07-08 PROBLEM — F01.50 VASCULAR DEMENTIA (HCC): Status: ACTIVE | Noted: 2022-07-08

## 2022-07-08 PROBLEM — E43 SEVERE PROTEIN-CALORIE MALNUTRITION (HCC): Status: ACTIVE | Noted: 2022-07-08

## 2022-07-08 PROBLEM — R77.8 ELEVATED TROPONIN: Status: ACTIVE | Noted: 2022-07-08

## 2022-07-08 PROBLEM — R62.51 FAILURE TO THRIVE (CHILD): Status: ACTIVE | Noted: 2022-07-08

## 2022-07-08 PROBLEM — R97.20 ELEVATED PSA: Status: ACTIVE | Noted: 2022-07-08

## 2022-07-08 PROBLEM — I95.9 HYPOTENSION: Status: ACTIVE | Noted: 2022-07-08

## 2022-07-08 LAB
ALBUMIN SERPL-MCNC: 3.6 G/DL (ref 3.4–5)
ANION GAP SERPL CALCULATED.3IONS-SCNC: 14 MMOL/L (ref 3–16)
ANION GAP SERPL CALCULATED.3IONS-SCNC: 15 MMOL/L (ref 3–16)
BUN BLDV-MCNC: 88 MG/DL (ref 7–20)
BUN BLDV-MCNC: 89 MG/DL (ref 7–20)
CALCIUM SERPL-MCNC: 9.5 MG/DL (ref 8.3–10.6)
CALCIUM SERPL-MCNC: 9.5 MG/DL (ref 8.3–10.6)
CHLORIDE BLD-SCNC: 106 MMOL/L (ref 99–110)
CHLORIDE BLD-SCNC: 109 MMOL/L (ref 99–110)
CHLORIDE URINE RANDOM: <20 MMOL/L
CO2: 26 MMOL/L (ref 21–32)
CO2: 27 MMOL/L (ref 21–32)
CREAT SERPL-MCNC: 2.8 MG/DL (ref 0.8–1.3)
CREAT SERPL-MCNC: 2.9 MG/DL (ref 0.8–1.3)
CREATININE URINE: 80.4 MG/DL (ref 39–259)
EKG ATRIAL RATE: 93 BPM
EKG DIAGNOSIS: NORMAL
EKG Q-T INTERVAL: 344 MS
EKG QRS DURATION: 106 MS
EKG QTC CALCULATION (BAZETT): 483 MS
EKG R AXIS: -66 DEGREES
EKG T AXIS: 95 DEGREES
EKG VENTRICULAR RATE: 119 BPM
GFR AFRICAN AMERICAN: 25
GFR AFRICAN AMERICAN: 26
GFR NON-AFRICAN AMERICAN: 20
GFR NON-AFRICAN AMERICAN: 21
GLUCOSE BLD-MCNC: 204 MG/DL (ref 70–99)
GLUCOSE BLD-MCNC: 213 MG/DL (ref 70–99)
PHOSPHORUS: 3.6 MG/DL (ref 2.5–4.9)
POTASSIUM SERPL-SCNC: 3.8 MMOL/L (ref 3.5–5.1)
POTASSIUM SERPL-SCNC: 3.9 MMOL/L (ref 3.5–5.1)
POTASSIUM, UR: 37.5 MMOL/L
PROSTATE SPECIFIC ANTIGEN: 48.88 NG/ML (ref 0–4)
SODIUM BLD-SCNC: 147 MMOL/L (ref 136–145)
SODIUM BLD-SCNC: 150 MMOL/L (ref 136–145)
SODIUM URINE: 32 MMOL/L
UREA NITROGEN, UR: 962.4 MG/DL (ref 800–1666)

## 2022-07-08 PROCEDURE — 87449 NOS EACH ORGANISM AG IA: CPT

## 2022-07-08 PROCEDURE — 2580000003 HC RX 258: Performed by: INTERNAL MEDICINE

## 2022-07-08 PROCEDURE — 92526 ORAL FUNCTION THERAPY: CPT

## 2022-07-08 PROCEDURE — 93010 ELECTROCARDIOGRAM REPORT: CPT | Performed by: INTERNAL MEDICINE

## 2022-07-08 PROCEDURE — 82436 ASSAY OF URINE CHLORIDE: CPT

## 2022-07-08 PROCEDURE — 36415 COLL VENOUS BLD VENIPUNCTURE: CPT

## 2022-07-08 PROCEDURE — 76770 US EXAM ABDO BACK WALL COMP: CPT

## 2022-07-08 PROCEDURE — 92610 EVALUATE SWALLOWING FUNCTION: CPT

## 2022-07-08 PROCEDURE — 84540 ASSAY OF URINE/UREA-N: CPT

## 2022-07-08 PROCEDURE — 6370000000 HC RX 637 (ALT 250 FOR IP): Performed by: INTERNAL MEDICINE

## 2022-07-08 PROCEDURE — 80069 RENAL FUNCTION PANEL: CPT

## 2022-07-08 PROCEDURE — 82570 ASSAY OF URINE CREATININE: CPT

## 2022-07-08 PROCEDURE — 1200000000 HC SEMI PRIVATE

## 2022-07-08 PROCEDURE — 6360000002 HC RX W HCPCS: Performed by: INTERNAL MEDICINE

## 2022-07-08 PROCEDURE — 84300 ASSAY OF URINE SODIUM: CPT

## 2022-07-08 PROCEDURE — 84133 ASSAY OF URINE POTASSIUM: CPT

## 2022-07-08 RX ORDER — DEXTROSE AND SODIUM CHLORIDE 5; .45 G/100ML; G/100ML
INJECTION, SOLUTION INTRAVENOUS CONTINUOUS
Status: DISPENSED | OUTPATIENT
Start: 2022-07-08 | End: 2022-07-11

## 2022-07-08 RX ADMIN — SERTRALINE 100 MG: 50 TABLET, FILM COATED ORAL at 09:29

## 2022-07-08 RX ADMIN — ACETAMINOPHEN 500 MG: 500 TABLET ORAL at 22:14

## 2022-07-08 RX ADMIN — Medication 1 TABLET: at 22:15

## 2022-07-08 RX ADMIN — TAMSULOSIN HYDROCHLORIDE 0.4 MG: 0.4 CAPSULE ORAL at 09:29

## 2022-07-08 RX ADMIN — DEXTROSE AND SODIUM CHLORIDE: 5; 450 INJECTION, SOLUTION INTRAVENOUS at 22:10

## 2022-07-08 RX ADMIN — POLYVINYL ALCOHOL 1 DROP: 14 SOLUTION/ DROPS OPHTHALMIC at 12:23

## 2022-07-08 RX ADMIN — POLYVINYL ALCOHOL 1 DROP: 14 SOLUTION/ DROPS OPHTHALMIC at 22:16

## 2022-07-08 RX ADMIN — FINASTERIDE 5 MG: 5 TABLET, FILM COATED ORAL at 09:30

## 2022-07-08 RX ADMIN — POLYVINYL ALCOHOL 1 DROP: 14 SOLUTION/ DROPS OPHTHALMIC at 16:19

## 2022-07-08 RX ADMIN — DEXTROSE AND SODIUM CHLORIDE: 5; 450 INJECTION, SOLUTION INTRAVENOUS at 12:21

## 2022-07-08 RX ADMIN — SENNOSIDES 8.6 MG: 8.6 TABLET, FILM COATED ORAL at 22:16

## 2022-07-08 RX ADMIN — HEPARIN SODIUM 5000 UNITS: 5000 INJECTION INTRAVENOUS; SUBCUTANEOUS at 22:12

## 2022-07-08 RX ADMIN — HEPARIN SODIUM 5000 UNITS: 5000 INJECTION INTRAVENOUS; SUBCUTANEOUS at 05:26

## 2022-07-08 RX ADMIN — DOCUSATE SODIUM 200 MG: 100 CAPSULE, LIQUID FILLED ORAL at 22:14

## 2022-07-08 RX ADMIN — DEXTROSE AND SODIUM CHLORIDE: 5; 450 INJECTION, SOLUTION INTRAVENOUS at 13:03

## 2022-07-08 RX ADMIN — HEPARIN SODIUM 5000 UNITS: 5000 INJECTION INTRAVENOUS; SUBCUTANEOUS at 12:23

## 2022-07-08 RX ADMIN — DONEPEZIL HYDROCHLORIDE 10 MG: 5 TABLET, FILM COATED ORAL at 22:14

## 2022-07-08 RX ADMIN — ASPIRIN 81 MG: 81 TABLET, CHEWABLE ORAL at 09:29

## 2022-07-08 RX ADMIN — POLYVINYL ALCOHOL 1 DROP: 14 SOLUTION/ DROPS OPHTHALMIC at 07:53

## 2022-07-08 ASSESSMENT — PAIN SCALES - GENERAL
PAINLEVEL_OUTOF10: 0

## 2022-07-08 ASSESSMENT — PAIN SCALES - WONG BAKER

## 2022-07-08 NOTE — PROGRESS NOTES
Facility/Department: [address] ONCOLOGY  Initial Assessment  DYSPHAGIA BEDSIDE SWALLOW EVALUATION     Patient: Allan Soloriosin   : 1926   MRN: [id number]      Evaluation Date: 2022   Admitting Diagnosis: Dehydration [E86.0]  Elevated troponin [R77.8]  PAMELA (acute kidney injury) ([facility] [address] [N17.9]  Constipation, unspecified constipation type [K59.00]  Acute renal failure superimposed on chronic kidney disease, on chronic dialysis, unspecified acute renal failure type ([facility] [address] [N17.9, N18.9, Z99.2]  Pain: Did not state                                                       H&P:   History provided by patient   This is a 80 y.o. male who presents to the ED for not eating for the past 4 days. He is weaker than normal.  Blood work was performed yesterday and showed PAMELA and hypernatremia therefore he was sent here. Patient has pain over his buttocks which he attributes to bedsore. He mainly complains of dry mouth. No pain anywhere else. No chest pain. No shortness of breath. History seems limited due to clinical condition. Imaging:  Chest X-ray:  2022  No acute airspace disease. Head CT:    Atrophy and moderate small vessel ischemic disease. History/Prior Level of Function:   Living Status: Pt is a resident at [facility]. Per RN report, pt was on a softer diet and required adaptive feeding equipment. Prior Dysphagia History: No other known dysphagia history   Reason for referral: SLP evaluation orders received due to limited PO intake  and cognitive state ; Per RN pt refusing meals and medications     Dysphagia Impressions/Diagnosis: Oropharyngeal Dysphagia   Pt was seen sitting upright in bed on room air. He was pleasant and able to follow commands with modifications (hard of hearing); required simplification and repetition. His oral cavity was severely dry and coated with mucous.  Pt reported this to be uncomfortable and painful to the level of his throat which is preventing him from eating/drinking. He had many missing teeth. Partial lower dentures were placed after completing significant oral care (toothettes, mouthwash, oral rinse). His oral motor strength and coordination was moderately impaired with reduced labial seal, greater on the left side. Oral phase was characterized by impaired labial seal resulting in anterior bolus loss of thin and puree consistencies. Pt was unable to adequately control nor masticate soft solids and resulted in expulsion. He demonstrated premature bolus loss with thin liquids in addition to a delayed audible swallow. No overt clinical s/s of aspiration were assessed however pt demonstrates high risk for reduced airway protection. Oral control improved with placement of straw in small sips and extended time for manipulation of puree consistencies. Pt presents at risk for aspiration due to his oral weakness, poor oral hygiene, and co morbidities. Strict aspiration precautions should be implemented in addition to feeding assistance. Recommend dysphagia I puree solids and thin liquids. Recommend frequent oral care, specifically after meals. Pt would benefit from dental consultation upon d/c. Recommended Diet and Intervention 7/8/2022:  Diet Solids Recommendation:  Dysphagia I Puree  Liquid Consistency Recommendation: Thin liquids  - via straw  Recommended form of Meds: Meds in puree  -one at a time    * Feeding assistance *Aspiration precautions        Compensatory Swallowing Strategies:  Check for pocketing of food L, Check for pocketing of food R, Upright as possible with all PO intake , Assist Feed , Eat/feed slowly    SHORT TERM DYSPHAGIA GOALS/PLAN OF CARE: Speech therapy for dysphagia tx 3-5 times per week during acute care stay.     Pt will functionally tolerate recommended diet with no overt clinical s/s of aspiration   Pt will demonstrate understanding of aspiration risk and precautions via education/demonstration with occasional prompting  Pt will advance to least restrictive diet as indicated     Dysphagia Therapeutic Intervention:  Oral Care, Diet Tolerance Monitoring , Patient/Family Education , Therapeutic Trials with SLP     Discharge Recommendations: Recommend ongoing SLP for dysphagia therapy upon discharge from hospital     Patient Positioning: Upright in bed     Current Diet Level (prior to evaluation): Regular texture diet  Thin liquids      Respiratory Status:   [x]Room Air   []O2 via nasal cannula   []Other:    Dentition:  []Adequate  [x]Dentures   [x]Missing Many Teeth  [x]Edentulous  []Other:    Baseline Vocal Quality:  []Normal  []Dysphonic   []Aphonic   [x]Hoarse  []Wet  [x]Weak  []Other:    Volitional Cough:  []Strong  [x]Weak  []Wet  []Absent  []Congested  []Other:    Volitional Swallow:   []Absent   [x]Delayed     []Adequate     []Required use of drink     Oral Mechanism Exam:  []WFL []Mild   [x] Moderate  []Severe  []To be assessed  Impaired:   [x]Left side      []Right side    [x]Labial ROM/Coordination    [x]Labial Symmetry   [x]Lingual ROM/Coordination   []Lingual Symmetry  []Gag  []Other:     Oral Phase: []WFL []Mild   [x] Moderate  [x]Severe  []To be assessed   [x]Impaired/Prolonged Mastication:   []Oral Holding:   [x]Spillage Left:   [x]Spillage Right:  []Pocketing Left:   []Pocketing Right:   [x]Decreased Anterior to Posterior Transit:   [x]Suspected Premature Bolus Loss:   [x]Lingual/Palatal Residue:   []Other:     Pharyngeal Phase: []WFL [x]Mild   [x] Moderate  []Severe  []To be assessed   [x]Delayed Swallow:   []Suspected Pharyngeal Pooling:   [x]Decreased Laryngeal Elevation:   []Absent Swallow:  []Wet Vocal Quality:   []Throat Clearing-Immediate:   []Throat Clearing-Delayed:   []Cough-Immediate:   []Cough-Delayed:  []Change in Vital Signs:  []Suspected Delayed Pharyngeal Clearing:  []Other:       Eating Assistance:  []Independent  []Setup or clean-up assistance   [] Supervision or touching assistance   [] Partial or moderate assistance   [x] Substantial or maximal assistance  [] Dependent       EDUCATION:   Provided education regarding role of SLP, results of assessment, recommendations and general speech pathology plan of care. [] Pt verbalized understanding and agreement   [x] Pt requires ongoing learning - RN present for education and verbalized understanding of recommendations   [] No evidence of comprehension     If patient discharges prior to next visit, this note will serve as discharge. Timed Code Minutes:  0  Total Treatment Minutes: 43 minutes     Electronically signed by:    Kun Arvizu M.A.  CCC-SLP S.P. [id number]  Speech-Language Pathologist   7/8/2022 9:47 AM

## 2022-07-08 NOTE — PROGRESS NOTES
Patient Active Problem List   Diagnosis    Acute renal failure superimposed on chronic kidney disease, on chronic dialysis ([facility] [address]    Failure to thrive (child)    Severe protein-calorie malnutrition ([facility] [address]    [facility]    Elevated troponin    Elevated PSA    Hypotension   H&P dictated

## 2022-07-08 NOTE — PROGRESS NOTES
Nephrology Consult received - full consult note to follow. Thank you for allowing us to participate in this patients care. Please do not hesitate to contact me, if any questions/concerns. We will follow along with you. Joanna Seymour MD  [facility] [address] [place] [address]   [phone] or [employer] [place].

## 2022-07-08 NOTE — H&P
[place] [id number]                     [address] [facility], [address]                              HISTORY AND PHYSICAL    PATIENT NAME: Dannie Stanley                     :        1926  MED REC NO:   [id number]                          ROOM:       6964  ACCOUNT NO:   [de-identified]                           ADMIT DATE: 2022  PROVIDER:     Mariel Lin MD    HISTORY OF PRESENT ILLNESS:  The patient is a 68-year-old black American  gentleman who came to the emergency room with history of poor oral  intake, increased lethargy, abnormal labs. The patient is a poor  historian, has significant dementia. He is feeling a lot weaker than  normal.  He is totally disoriented. He has increased psychomotor  sluggishness. He is nonambulatory, feeling extremely weak. He also has  a decubitus ulceration on buttock. He has dryness of mouth. There is  no chest pain or shortness of breath. The patient is oliguric. PAST MEDICAL HISTORY:  Pertinent for senile dementia, age-related  physical debility, anxiety neurosis, benign prostatic hyperplasia,  bradycardia, cardiomyopathy, type 2 diabetes mellitus, dizziness,  dysphagia, hypertension, hyperlipidemia, major depression, cognitive  impairment, generalized muscle weakness, systolic congestive heart  _____, unsteadiness of the feet. MEDICATIONS:  The patient is on Lasix, acetaminophen, Januvia, aspirin,  calcium with vitamin D, cetirizine, docusate, Aricept, Proscar,  haloperidol, heparin, metoprolol, Versed, Liquifilm Tears, Senokot,  Zoloft, Flomax, multivitamin, cyanocobalamin and vitamin D3. ALLERGIES:  The patient is allergic to AMITRIPTYLINE, GABAPENTIN,  MERCUROCHROME, and THIOMERSAL. SOCIAL HISTORY:  He is a  man. He has a stepdaughter. He has  not done any smoking or drinking in years. 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 Full details are not  available.     FAMILY HISTORY:  Both his parents are  because of natural  causes. No further history available or obtainable. I could not reach  any of his family member. REVIEW OF SYSTEMS:  Negative for loss of consciousness. No convulsions. He does have increased confusion. The patient has a very weak  phonation, may have some oropharyngeal dysphagia. Has extremely poor  appetite, failure to thrive. The patient is cachectic, nonambulatory. The patient is oliguric and incontinent. He is total care. He does  have generalized neuromuscular weakness. PHYSICAL EXAMINATION:  GENERAL:  Lethargic, incoherent, emaciated 51-year-old black American  man looking consistent with his old age. VITAL SIGNS:  His temperature is 97.4, blood pressure is 102/56,  respirations 16, heart rate is 72. Blood pressure initially was  95/_____, heart rate was 121. HEENT:  Oral mucosa dry. SKIN:  Warm and dry. Poor turgor. NECK:  Supple. No jugular venous distention. Faint carotid bruit. LUNGS:  Vesicular breath sounds. Poor inspiration. No crackles or  wheezing. HEART:  Regular rate and rhythm. S1, S2.  A 2/6 systolic ejection  murmur. No gallop rhythm. ABDOMEN:  Soft, nontender, scaphoid. Bowel sounds present. EXTREMITIES:  Show distal pulsations are weak. No distal edema. NEUROLOGIC:  The patient appears grossly intact. The patient has no  acute sensorimotor focal deficit. The patient has total lack of  coordination. The patient also has decubitus ulceration in the lower  back region. LABORATORY EVALUATION:  Shows sodium 150, potassium 4.7, chloride 102,  CO2 27, BUN 90, creatinine 3.4, anion gap is 17, lactic acid level is  2.5. Troponin is 0.30. Albumin 4.1. Blood sugar is 159. White blood  cell count 8.4, hemoglobin and hematocrit are 13.1 and 41.2, platelet  count is 521. PSA is 48.88. Urinalysis shows no evidence of any gross  infection. ABG shows pH of 7.42, pCO2 41, pO2 116, bicarbonate 27.3. Head CT and CT of the abdomen and pelvis, which I wonder why any of them  were done to begin with, shows atrophy with small vessel ischemia of the  brain. CT scan of the abdomen and pelvis shows no evidence of  obstructive uropathy; large stool ball at the rectum, correlate with  fecal impaction; nodular contour of the liver surface, correlate for  cirrhosis; gallbladder sludge. Ultrasound of the kidney has been  ordered. ASSESSMENT:  Acute on chronic renal failure, elevated troponin, elevated  PSA, altered mental status, severe protein-calorie malnutrition, failure  to thrive, cannot exclude stroke, lactic acidosis, elevated troponin,  and senile dementia. PLAN:  Get him admitted. Treat him with IV hydration. Monitor renal  function. Nephrology consultation comanagement. At the present time,  more than anything else, the patient needs palliative care.         Marjorie Cobos MD    D: 07/08/2022 12:23:34       T: 07/08/2022 12:27:29     [email or web address]  Job#: [id number]     Doc#: [id number]    CC:

## 2022-07-08 NOTE — PLAN OF CARE
Problem: Skin/Tissue Integrity  Goal: Absence of new skin breakdown  Description: 1. Monitor for areas of redness and/or skin breakdown  2. Assess vascular access sites hourly  3. Every 4-6 hours minimum:  Change oxygen saturation probe site  4. Every 4-6 hours:  If on nasal continuous positive airway pressure, respiratory therapy assess nares and determine need for appliance change or resting period.   7/8/2022 0853 by Hayden Cedeño RN  Outcome: Progressing  7/8/2022 0213 by Timoteo Zhang RN  Outcome: Progressing     Problem: Pain  Goal: Verbalizes/displays adequate comfort level or baseline comfort level  7/8/2022 0853 by Hayden Cedeño RN  Outcome: Progressing  Flowsheets (Taken 7/8/2022 0741)  Verbalizes/displays adequate comfort level or baseline comfort level: Encourage patient to monitor pain and request assistance  7/8/2022 0213 by Timoteo Zhang RN  Outcome: Progressing  Flowsheets  Taken 7/7/2022 2348  Verbalizes/displays adequate comfort level or baseline comfort level:   Encourage patient to monitor pain and request assistance   Assess pain using appropriate pain scale   Administer analgesics based on type and severity of pain and evaluate response   Implement non-pharmacological measures as appropriate and evaluate response   Consider cultural and social influences on pain and pain management   Notify Licensed Independent Practitioner if interventions unsuccessful or patient reports new pain  Taken 7/7/2022 2015  Verbalizes/displays adequate comfort level or baseline comfort level:   Encourage patient to monitor pain and request assistance   Assess pain using appropriate pain scale   Administer analgesics based on type and severity of pain and evaluate response   Implement non-pharmacological measures as appropriate and evaluate response   Consider cultural and social influences on pain and pain management   Notify Licensed Independent Practitioner if interventions unsuccessful or patient

## 2022-07-08 NOTE — PLAN OF CARE
Problem: Skin/Tissue Integrity  Goal: Absence of new skin breakdown  Description: 1. Monitor for areas of redness and/or skin breakdown  2. Assess vascular access sites hourly  3. Every 4-6 hours minimum:  Change oxygen saturation probe site  4. Every 4-6 hours:  If on nasal continuous positive airway pressure, respiratory therapy assess nares and determine need for appliance change or resting period.   7/8/2022 0213 by Ayde Palacios RN  Outcome: Progressing  7/7/2022 1724 by Zora Jean Baptiste RN  Outcome: Progressing     Problem: Pain  Goal: Verbalizes/displays adequate comfort level or baseline comfort level  7/8/2022 0213 by Ayde Palacios RN  Outcome: Progressing  Flowsheets  Taken 7/7/2022 2348  Verbalizes/displays adequate comfort level or baseline comfort level:   Encourage patient to monitor pain and request assistance   Assess pain using appropriate pain scale   Administer analgesics based on type and severity of pain and evaluate response   Implement non-pharmacological measures as appropriate and evaluate response   Consider cultural and social influences on pain and pain management   Notify Licensed Independent Practitioner if interventions unsuccessful or patient reports new pain  Taken 7/7/2022 2015  Verbalizes/displays adequate comfort level or baseline comfort level:   Encourage patient to monitor pain and request assistance   Assess pain using appropriate pain scale   Administer analgesics based on type and severity of pain and evaluate response   Implement non-pharmacological measures as appropriate and evaluate response   Consider cultural and social influences on pain and pain management   Notify Licensed Independent Practitioner if interventions unsuccessful or patient reports new pain  7/7/2022 1724 by Zora Jean Baptiste RN  Outcome: Progressing  Flowsheets (Taken 7/7/2022 1709)  Verbalizes/displays adequate comfort level or baseline comfort level: Encourage patient to monitor pain and request assistance     Problem: Safety - Adult  Goal: Free from fall injury  7/8/2022 0213 by Andres Connor RN  Outcome: Progressing  7/7/2022 1724 by Louise Langston RN  Outcome: Progressing     Problem: ABCDS Injury Assessment  Goal: Absence of physical injury  7/8/2022 0213 by Andres Connor RN  Outcome: Progressing  7/7/2022 1724 by Louise Langston RN  Outcome: Progressing

## 2022-07-08 NOTE — PROGRESS NOTES
Nutrition Note    RECOMMENDATIONS  1. PO Diet: Recommend to liberalize diet to regular with  appropriate consistency per SLP to promote PO Intake   2. ONS: Offer Ensure Enlive BID   3. Nutrition Support: None     NUTRITION ASSESSMENT   Pt triggered positive nursing nutrition screen for MST 2. Pt with hx dementia; spoke with pt's wife at bedside. Pt's wife reports pt is typically a very good eater, but has not eaten anything x 4 days prior to admission. No weight hx available for review in EMR. Pt currently on a dysphagia pureed/3 CCC/cardiac/2 gram Na+/low K+/low phos diet, taking only a few bites of meals. Pt took only a few bites of lunch today. K+ and Phos have been WNL since admission. Recommend to liberalize diet to regular to promote PO intake. Will also add Ensure Enlive BID.  Nutrition Related Findings: Na+ 150. No edema noted. LBM 7/7/.   Wounds: None   Nutrition Education:  Education not indicated    Nutrition Goals: PO intake 50% or greater     MALNUTRITION ASSESSMENT   Malnutrition Status: Insufficient data    NUTRITION DIAGNOSIS   · Inadequate protein-energy intake related to inadequate protein-energy intake as evidenced by poor intake prior to admission,intake 0-25%    CURRENT NUTRITION THERAPIES  ADULT DIET; Dysphagia - Pureed; 3 carb choices (45 gm/meal); Low Fat/Low Chol/High Fiber/VIJAY; Low Sodium (2 gm); Low Potassium (Less than 3000 mg/day); Low Phosphorus (Less than 1000 mg); Less than 60 gm     PO Intake: 1-25%   PO Supplement Intake:None Ordered    ANTHROPOMETRICS   Current Height: 6' (182.9 cm)   Current Weight: 146 lb 9.7 oz (66.5 kg)     Ideal Body Weight (IBW): 178 lbs  (81 kg)        BMI: 19.9    COMPARATIVE STANDARDS  Energy (kcal):  2235-4655     Protein (g):   grams       Fluid (mL/day):  6346-0837 mL    The patient will be monitored per nutrition standards of care. Consult dietitian if additional nutrition interventions are needed prior to RD reassessment.      Skye

## 2022-07-08 NOTE — CONSULTS
Samuel Felling, MD Branson Bumpers, MD Linder Edelson, MD               Office: [phone]                      Fax: [phone]             [facility]                   NEPHROLOGY INITIAL CONSULT NOTE:     PATIENT NAME: Rosalino Walter  : 1926  MRN: [id number]  REASON FOR CONSULT: For evaluation and management of Acute Kidney Injury. (My recommendations will be communicated by way of shared medical record.)      RECOMMENDATIONS:   - D5 + 0.45% saline 100 -> increase to 150. cc/h   - check urine lytes,  - f/u recheck renal panel today  - laws inserted  - no need for dialysis,  at higher risk for decompensation, needing closer monitoring.     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, on chronic dialysis, unspecified acute renal failure type ([address] [N17.9, N18.9, Z99.2]      PAMELA (on CKD: 3A):   - BL Scr-  1.2-1.3 as off 2018  ---> 3.4 on admission  -: Etiology of PAMELA - presumed pre-renal   - other differentials: unlikely ATN or  GN / TI / TMA process  - UA : results reviewed: relatively bland = pre-renal       Associated problems:   - Volume status: hypo-volemic  : BP: no need for tight control   : Na: hypernatremia - mild dehydration     - Azotemia: pre-renal   - Electrolytes: K: WNL  - Acid-Base: WNL  - Anemia: likely of CKD - mild        Other major problems: Management per primary and other consulting teams.   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 - Nephrotoxic medications have been discontinued. - Dose adjusted and appropriate. - Dose meds for eGFR <15 mL/min/1.73m2 during PAMELA    - Avoid heavy opioids due to renal failure - may use very low dose dilaudid / fentanyl with close monitoring of CNS and respiratory depression. Please refer to the orders. High Complexity. Multiple complex problems. Discussed with patient 's treatment team-   Time spent > 30 (~35) minutes. Thank you for allowing me to participate in this patient's care. Please do not hesitate to contact me anytime. We will follow along with you. Giorgi Gonzales MD,  [facility] [address] [place] [address] [place]: [phone] or Via [employer]  Fax: [phone]        =======================================================================================   =======================================================================================      CHIEF COMPLAINT:   Chief Complaint   Patient presents with    Other     Pt srrived via [facility] from [address] home w c/o abnormal labs.      History Obtained From:  reason patient could not give history:  altered mental status + treatment team + Electronic Medical Records    HPI: Mr. Huyen Mccarty is a 80 y.o. male with significant past medical history as below:   Past Medical History:   Diagnosis Date    Age-related physical debility     Anxiety disorder due to known physiological condition     Anxiety disorder, unspecified     Benign prostatic hyperplasia without lower urinary tract symptoms     Bradycardia, unspecified     Cardiomyopathy [facility]     Diabetes mellitus ([facility]     Dizziness and giddiness     Dysphagia, oropharyngeal phase     Hyperlipidemia     Hypertension     Major depressive disorder, recurrent, moderate (HCC)     Mild cognitive impairment, so stated     Muscle weakness (generalized)     Unspecified history. SOCIAL HISTORY:   Social History     Socioeconomic History    Marital status:      Spouse name: None    Number of children: None    Years of education: None    Highest education level: None   Occupational History    None   Tobacco Use    Smoking status: Never Smoker    Smokeless tobacco: Never Used   Vaping Use    Vaping Use: Never used   Substance and Sexual Activity    Alcohol use: Not Currently    Drug use: Not Currently    Sexual activity: Not Currently   Other Topics Concern    None   Social History Narrative    None     Social Determinants of Health     Financial Resource Strain:     Difficulty of Paying Living Expenses: Not on file   Food Insecurity:     Worried About Running Out of Food in the Last Year: Not on file    Enriqueta of Food in the Last Year: Not on file   Transportation Needs:     Lack of Transportation (Medical): Not on file    Lack of Transportation (Non-Medical): Not on file   Physical Activity:     Days of Exercise per Week: Not on file    Minutes of Exercise per Session: Not on file   Stress:     Feeling of Stress : Not on file   Social Connections:     Frequency of Communication with Friends and Family: Not on file    Frequency of Social Gatherings with Friends and Family: Not on file    Attends Rastafari Services: Not on file    Active Member of [address] [place] [address] [employer] or Organizations: Not on file    Attends Club or Organization Meetings: Not on file    Marital Status: Not on file   Intimate Partner Violence:     Fear of Current or Ex-Partner: Not on file    Emotionally Abused: Not on file    Physically Abused: Not on file    Sexually Abused: Not on file   Housing Stability:     Unable to Pay for Housing in the Last Year: Not on file    Number of [place] in the Last Year: Not on file    Unstable Housing in the Last Year: Not on file          MEDICATIONS: reviewed by me.    Medications Prior to Admission:  No current facility-administered medications on file prior to encounter. Current Outpatient Medications on File Prior to Encounter   Medication Sig Dispense Refill    acetaminophen (TYLENOL) 500 MG tablet Take 500 mg by mouth nightly Indications: Nerve Disease      mineral oil-hydrophilic petrolatum (AQUAPHOR) ointment Apply topically nightly Apply to feet and legs topically at bedtime      aspirin 81 MG chewable tablet Take 81 mg by mouth daily      Cyanocobalamin (B-12) 1000 MCG CAPS Take by mouth daily      vitamin D (CHOLECALCIFEROL) 25 MCG (1000 UT) TABS tablet Take 1,000 Units by mouth daily      docusate sodium (COLACE) 100 MG capsule Take 200 mg by mouth nightly Indications: Constipation      donepezil (ARICEPT) 10 MG tablet Take 10 mg by mouth nightly Indications: 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       metoprolol succinate (TOPROL XL) 25 MG extended release tablet Take 12.5 mg by mouth daily Hold for SBP <110 or HR <60      Multiple Vitamins-Minerals (THERAPEUTIC MULTIVITAMIN-MINERALS) tablet Take 1 tablet by mouth daily      calcium-vitamin D (OSCAL-500) 500-200 MG-UNIT per tablet Take 1 tablet by mouth 2 times daily      senna (SENOKOT) 8.6 MG tablet Take 1 tablet by mouth daily as needed for Constipation      senna (SENOKOT) 8.6 MG tablet Take 2 tablets by mouth daily as needed for Constipation      SITagliptin (JANUVIA) 25 MG tablet Take 25 mg by mouth daily      polyethyl glycol-propyl glycol 0.4-0.3 % (SYSTANE) 0.4-0.3 % ophthalmic solution 1 drop 4 times daily Instill one drop in both eyes daily      acetaminophen (TYLENOL) 325 MG tablet Take 650 mg by mouth every 6 hours as needed for Pain or Fever      sertraline (ZOLOFT) 100 MG tablet Take 100 mg by mouth daily      sertraline (ZOLOFT) 25 MG tablet Take 25 mg by mouth daily           Current Facility-Administered Medications:     dextrose 5 % and 0.45 % sodium chloride infusion, , IntraVENous, Continuous, Cornel Houston MD, Last Rate: 100 mL/hr at 07/07/22 1547, New Bag at 07/07/22 1547    heparin (porcine) injection 5,000 Units, 5,000 Units, SubCUTAneous, 3 times per day, Cornel Houston MD, 5,000 Units at 07/08/22 0526    acetaminophen (TYLENOL) tablet 650 mg, 650 mg, Oral, Q6H PRN, Cornel Houston MD    acetaminophen (TYLENOL) tablet 500 mg, 500 mg, Oral, Nightly, Cornel Houston MD    aspirin chewable tablet 81 mg, 81 mg, Oral, Daily, Cornel Houston MD, 81 mg at 07/08/22 0929    calcium-vitamin D (OSCAL-500) 500-200 MG-UNIT per tablet 1 tablet, 1 tablet, Oral, BID, Cornel Houston MD    vitamin B-12 (CYANOCOBALAMIN) tablet 1,000 mcg, 1,000 mcg, Oral, Daily, Cornel Houston MD    docusate sodium (COLACE) capsule 200 mg, 200 mg, Oral, Nightly, Cornel Houston MD    donepezil (ARICEPT) tablet 10 mg, 10 mg, Oral, Nightly, Cornel Houston MD    cetirizine (ZYRTEC) tablet 5 mg, 5 mg, Oral, Daily, Cornel Houston MD    finasteride (PROSCAR) tablet 5 mg, 5 mg, Oral, Daily, Cornel Houston MD, 5 mg at 07/08/22 0930    lidocaine (XYLOCAINE) 5 % ointment, , Topical, Q8H PRN, Cornel Houston MD    metoprolol succinate (TOPROL XL) extended release tablet 12.5 mg, 12.5 mg, Oral, Daily, Cornel Houston MD    dermaphor ointment, , Topical, Nightly, Cornel Houston MD    therapeutic multivitamin-minerals 1 tablet, 1 tablet, Oral, Daily, Cornel Houston MD    polyvinyl alcohol (LIQUIFILM TEARS) 1.4 % ophthalmic solution 1 drop, 1 drop, Both Eyes, 4x Daily, Cornel Houston MD, 1 drop at 07/08/22 0753    senna (SENOKOT) tablet 8.6 mg, 1 tablet, Oral, Nightly, Leslie Estrada MD    sertraline (ZOLOFT) tablet 100 mg, 100 mg, Oral, Daily, Leslie Estrada MD, 100 mg at 07/08/22 4784    alogliptin (NESINA) tablet 6.25 mg, 6.25 mg, Oral, Daily, Leslie Estrada MD    [facility]) capsule 0.4 mg, 0.4 mg, Oral, Daily, Leslie Estrada MD, 0.4 mg at 07/08/22 8203    vitamin D (CHOLECALCIFEROL) tablet 1,000 Units, 1,000 Units, Oral, Daily, Leslie Estrada MD      Allergies reviewed by me: Amitriptyline, Gabapentin, Mercurochrome [merbromin], Mercury, and Merthiolate glycerite [thimerosal]    REVIEW OF SYSTEMS:Review of systems not obtained due to patient factors - mental status          =======================================================================================     PHYSICAL EXAM:  Recent vital signs and recent I/Os reviewed by me.      Wt Readings from Last 3 Encounters:   07/07/22 146 lb 9.7 oz (66.5 kg)     BP Readings from Last 3 Encounters:   07/08/22 115/71   04/01/22 128/80     Patient Vitals for the past 24 hrs:   BP Temp Temp src Pulse Resp SpO2 Height Weight   07/08/22 0741 115/71 97.6 °F (36.4 °C) Oral 73 16 99 % -- --   07/08/22 0400 126/78 98.1 °F (36.7 °C) Oral 61 16 100 % -- --   07/07/22 2348 131/82 97.6 °F (36.4 °C) Oral 62 16 100 % -- --   07/07/22 2015 (!) 102/56 97.4 °F (36.3 °C) Oral 72 16 100 % -- --   07/07/22 1713 -- -- -- -- -- -- 6' (1.829 m) 146 lb 9.7 oz (66.5 kg)   07/07/22 1709 (!) 95/59 97.5 °F (36.4 °C) Oral 78 16 100 % -- --   07/07/22 1515 123/73 -- -- 96 16 98 % -- --   07/07/22 1445 107/64 -- -- 79 12 -- -- --   07/07/22 1402 120/71 -- -- 81 16 -- -- --   07/07/22 1246 118/69 -- -- 83 15 100 % -- --   07/07/22 1146 120/86 -- -- (!) 116 21 99 % -- --   07/07/22 1132 100/66 -- -- (!) 120 20 98 % -- --   07/07/22 1046 90/65 -- -- (!) 116 26 98 % -- --       Intake/Output Summary (Last 24 hours) at 7/8/2022 1043  Last data filed at 7/8/2022 1014  Gross per 24 hour   Intake 360 ml   Output 500 ml   Net -140 ml         Physical Exam  Vitals reviewed. Constitutional:       General: He is not in acute distress. Appearance: He is ill-appearing. HENT:      Head: Normocephalic and atraumatic. Right Ear: External ear normal.      Left Ear: External ear normal.      Nose: Nose normal.      Mouth/Throat:      Mouth: Mucous membranes are dry. Eyes:      General: No scleral icterus. Conjunctiva/sclera: Conjunctivae normal.   Neck:      Vascular: No JVD. Cardiovascular:      Rate and Rhythm: Normal rate and regular rhythm. Heart sounds: S1 normal and S2 normal.   Pulmonary:      Effort: Pulmonary effort is normal. No respiratory distress. Breath sounds: Rhonchi present. Abdominal:      General: Bowel sounds are normal. There is no distension. Musculoskeletal:         General: No swelling or deformity. Cervical back: Normal range of motion and neck supple. Skin:     General: Skin is dry. Coloration: Skin is not jaundiced. Neurological:      Mental Status: He is disoriented.                =======================================================================================     DATA:  Diagnostic tests reviewed by me for today's visit:   (AS NEEDED FOR MY EVALUATION AND MANAGEMENT). Recent Labs     07/07/22  0805   WBC 8.4   HCT 41.2   *     Iron Saturation:  No components found for: PERCENTFE  FERRITIN:  No results found for: FERRITIN  IRON:  No results found for: IRON  TIBC:  No results found for: TIBC    Recent Labs     07/07/22  0805 07/08/22  0444   * 147*   K 4.7 3.8    106   CO2 27 26   BUN 90* 88*   CREATININE 3.4* 2.9*     Recent Labs     07/07/22  0805 07/08/22  0444   CALCIUM 9.9 9.5     No results for input(s): PH, PCO2, PO2 in the last 72 hours.     Invalid input(s): Falguni Garduno    ABG:  No results found for: PH, PCO2, PO2, HCO3, BE, THGB, TCO2, O2SAT  VBG:    Lab Results   Component Value Date/Time    PHVEN 7.428 07/07/2022 08:05 AM    UJM6DXI 41.4 07/07/2022 08:05 AM    BEVEN 2.7 07/07/2022 08:05 AM    L0QQVFTW 99 07/07/2022 08:05 AM       LDH:  No results found for: LDH  Uric Acid:  No results found for: LABURIC, URICACID    PT/INR:  No results found for: PROTIME, INR  Warfarin PT/INR:  No components found for: PTPATWAR, PTINRWAR  PTT:  No results found for: APTT, PTT[APTT}  Last 3 Troponin:    Lab Results   Component Value Date/Time    TROPONINI 0.22 07/07/2022 10:31 AM    TROPONINI 0.30 07/07/2022 08:05 AM       U/A:    Lab Results   Component Value Date/Time    COLORU Yellow 07/07/2022 08:05 AM    PROTEINU TRACE 07/07/2022 08:05 AM    PHUR 5.0 07/07/2022 08:05 AM    WBCUA 0-2 07/07/2022 08:05 AM    RBCUA None seen 07/07/2022 08:05 AM    BACTERIA None Seen 07/07/2022 08:05 AM    CLARITYU Clear 07/07/2022 08:05 AM    SPECGRAV 1.015 07/07/2022 08:05 AM    LEUKOCYTESUR TRACE 07/07/2022 08:05 AM    UROBILINOGEN 0.2 07/07/2022 08:05 AM    BILIRUBINUR Negative 07/07/2022 08:05 AM    BLOODU Negative 07/07/2022 08:05 AM    GLUCOSEU Negative 07/07/2022 08:05 AM     Microalbumen/Creatinine ratio:  No components found for: RUCREAT  24 Hour Urine for Protein:  No components found for: RAWUPRO, UHRS3, [id number], UTV3  24 Hour Urine for Creatinine Clearance:  No components found for: CREAT4, UHRS10, UTV10  Urine Toxicology:  No components found for: IAMMENTA, IBARBIT, IBENZO, ICOCAINE, IMARTHC, IOPIATES, IPHENCYC    HgBA1c:  No results found for: LABA1C  RPR:  No results found for: RPR  HIV:  No results found for: HIV  SIMA:  No results found for: ANATITER, SIMA  RF:  No results found for: RF  DSDNA:  No components found for: DNA  AMYLASE:  No results found for: AMYLASE  LIPASE:  No results found for: LIPASE  Fibrinogen Level:  No components found for: FIB       BELOW MENTIONED RADIOLOGY STUDY RESULTS BY ME (AS NEEDED FOR MY EVALUATION AND MANAGEMENT).      CT ABDOMEN PELVIS WO CONTRAST Additional Contrast? None    Result Date: 7/7/2022  EXAMINATION: CT OF THE ABDOMEN AND PELVIS WITHOUT CONTRAST 7/7/2022 8:03 am TECHNIQUE: CT of the abdomen and pelvis was performed without the administration of intravenous contrast. Multiplanar reformatted images are provided for review. Automated exposure control, iterative reconstruction, and/or weight based adjustment of the mA/kV was utilized to reduce the radiation dose to as low as reasonably achievable. COMPARISON: None. HISTORY: ORDERING SYSTEM PROVIDED HISTORY: arabella, no abd pain TECHNOLOGIST PROVIDED HISTORY: Reason for exam:->arabella, no abd pain Additional Contrast?->None Decision Support Exception - unselect if not a suspected or confirmed emergency medical condition->Emergency Medical Condition (MA) Reason for Exam: arabella, no abd pain FINDINGS: Lower Chest: Trace left pleural effusion. Mild bibasilar atelectasis or fibrosis. Organs: Lack of IV and oral contrast limits sensitivity for visceral organ pathology. Within the kidneys bilaterally, no jc calculi. No hydronephrosis or perinephric stranding. No ureteral calculus. 1.4 cm fluid density lesion at the hepatic dome, Hounsfield units 13, likely cyst.  Liver demonstrates a nodular surface. Layering density within the gallbladder, compatible with sludge. Common bile duct is approximately 9 mm in caliber, likely related to patient age. Spleen is within normal limits. Stomach is decompressed. No pancreatic ductal dilatation or stranding. Thickening of the adrenal glands, maintaining adrenal shape. Calcification of the abdominal aorta and iliac arteries. Shotty retroperitoneal lymph nodes. GI/Bowel: Large stool ball is seen at the rectum measuring approximately 8.6 x 7.2 cm. Diverticulosis. Appendix is within normal limits in caliber. Small bowel is nondilated. Pelvis: Bladder is decompressed, with Westbrook catheter. No inguinal adenopathy. Peritoneum/Retroperitoneum: No free air.  Bones/Soft Tissues: Metallic fragment is demonstrated at the

## 2022-07-09 LAB
ALBUMIN SERPL-MCNC: 3.1 G/DL (ref 3.4–5)
ANION GAP SERPL CALCULATED.3IONS-SCNC: 8 MMOL/L (ref 3–16)
BUN BLDV-MCNC: 61 MG/DL (ref 7–20)
CALCIUM SERPL-MCNC: 8.5 MG/DL (ref 8.3–10.6)
CHLORIDE BLD-SCNC: 102 MMOL/L (ref 99–110)
CO2: 28 MMOL/L (ref 21–32)
CREAT SERPL-MCNC: 2.1 MG/DL (ref 0.8–1.3)
GFR AFRICAN AMERICAN: 36
GFR NON-AFRICAN AMERICAN: 29
GLUCOSE BLD-MCNC: 160 MG/DL (ref 70–99)
GLUCOSE BLD-MCNC: 174 MG/DL (ref 70–99)
GLUCOSE BLD-MCNC: 302 MG/DL (ref 70–99)
GLUCOSE BLD-MCNC: 304 MG/DL (ref 70–99)
L. PNEUMOPHILA SEROGP 1 UR AG: NORMAL
PERFORMED ON: ABNORMAL
PHOSPHORUS: 1.9 MG/DL (ref 2.5–4.9)
POTASSIUM SERPL-SCNC: 3.9 MMOL/L (ref 3.5–5.1)
SODIUM BLD-SCNC: 138 MMOL/L (ref 136–145)

## 2022-07-09 PROCEDURE — 36415 COLL VENOUS BLD VENIPUNCTURE: CPT

## 2022-07-09 PROCEDURE — 6370000000 HC RX 637 (ALT 250 FOR IP): Performed by: INTERNAL MEDICINE

## 2022-07-09 PROCEDURE — 2580000003 HC RX 258: Performed by: INTERNAL MEDICINE

## 2022-07-09 PROCEDURE — 6360000002 HC RX W HCPCS: Performed by: INTERNAL MEDICINE

## 2022-07-09 PROCEDURE — 80069 RENAL FUNCTION PANEL: CPT

## 2022-07-09 PROCEDURE — 1200000000 HC SEMI PRIVATE

## 2022-07-09 RX ORDER — INSULIN LISPRO 100 [IU]/ML
0-12 INJECTION, SOLUTION INTRAVENOUS; SUBCUTANEOUS
Status: DISCONTINUED | OUTPATIENT
Start: 2022-07-09 | End: 2022-07-12 | Stop reason: HOSPADM

## 2022-07-09 RX ORDER — DEXTROSE MONOHYDRATE 50 MG/ML
100 INJECTION, SOLUTION INTRAVENOUS PRN
Status: DISCONTINUED | OUTPATIENT
Start: 2022-07-09 | End: 2022-07-12 | Stop reason: HOSPADM

## 2022-07-09 RX ORDER — INSULIN LISPRO 100 [IU]/ML
0-6 INJECTION, SOLUTION INTRAVENOUS; SUBCUTANEOUS NIGHTLY
Status: DISCONTINUED | OUTPATIENT
Start: 2022-07-09 | End: 2022-07-12 | Stop reason: HOSPADM

## 2022-07-09 RX ORDER — INSULIN GLARGINE 100 [IU]/ML
0.15 INJECTION, SOLUTION SUBCUTANEOUS NIGHTLY
Status: DISCONTINUED | OUTPATIENT
Start: 2022-07-09 | End: 2022-07-12 | Stop reason: HOSPADM

## 2022-07-09 RX ORDER — TRAMADOL HYDROCHLORIDE 50 MG/1
50 TABLET ORAL EVERY 12 HOURS PRN
Status: DISCONTINUED | OUTPATIENT
Start: 2022-07-09 | End: 2022-07-12 | Stop reason: HOSPADM

## 2022-07-09 RX ORDER — INSULIN LISPRO 100 [IU]/ML
0-12 INJECTION, SOLUTION INTRAVENOUS; SUBCUTANEOUS
Status: DISCONTINUED | OUTPATIENT
Start: 2022-07-09 | End: 2022-07-09

## 2022-07-09 RX ORDER — DEXTROSE MONOHYDRATE 50 MG/ML
100 INJECTION, SOLUTION INTRAVENOUS PRN
Status: DISCONTINUED | OUTPATIENT
Start: 2022-07-09 | End: 2022-07-09

## 2022-07-09 RX ORDER — INSULIN LISPRO 100 [IU]/ML
0-6 INJECTION, SOLUTION INTRAVENOUS; SUBCUTANEOUS NIGHTLY
Status: DISCONTINUED | OUTPATIENT
Start: 2022-07-09 | End: 2022-07-09

## 2022-07-09 RX ORDER — GABAPENTIN 100 MG/1
100 CAPSULE ORAL 3 TIMES DAILY
Status: DISCONTINUED | OUTPATIENT
Start: 2022-07-09 | End: 2022-07-09

## 2022-07-09 RX ADMIN — Medication 1 TABLET: at 09:44

## 2022-07-09 RX ADMIN — CETIRIZINE HYDROCHLORIDE 5 MG: 10 TABLET, FILM COATED ORAL at 09:44

## 2022-07-09 RX ADMIN — DEXTROSE AND SODIUM CHLORIDE: 5; 450 INJECTION, SOLUTION INTRAVENOUS at 12:11

## 2022-07-09 RX ADMIN — FINASTERIDE 5 MG: 5 TABLET, FILM COATED ORAL at 09:44

## 2022-07-09 RX ADMIN — POLYVINYL ALCOHOL 1 DROP: 14 SOLUTION/ DROPS OPHTHALMIC at 16:50

## 2022-07-09 RX ADMIN — ALOGLIPTIN 6.25 MG: 6.25 TABLET, FILM COATED ORAL at 09:45

## 2022-07-09 RX ADMIN — ASPIRIN 81 MG: 81 TABLET, CHEWABLE ORAL at 09:45

## 2022-07-09 RX ADMIN — CYANOCOBALAMIN TAB 1000 MCG 1000 MCG: 1000 TAB at 09:44

## 2022-07-09 RX ADMIN — ACETAMINOPHEN 500 MG: 500 TABLET ORAL at 21:36

## 2022-07-09 RX ADMIN — HEPARIN SODIUM 5000 UNITS: 5000 INJECTION INTRAVENOUS; SUBCUTANEOUS at 05:47

## 2022-07-09 RX ADMIN — HEPARIN SODIUM 5000 UNITS: 5000 INJECTION INTRAVENOUS; SUBCUTANEOUS at 21:37

## 2022-07-09 RX ADMIN — DEXTROSE AND SODIUM CHLORIDE: 5; 450 INJECTION, SOLUTION INTRAVENOUS at 22:07

## 2022-07-09 RX ADMIN — SERTRALINE 100 MG: 50 TABLET, FILM COATED ORAL at 09:44

## 2022-07-09 RX ADMIN — Medication 1000 UNITS: at 09:44

## 2022-07-09 RX ADMIN — HEPARIN SODIUM 5000 UNITS: 5000 INJECTION INTRAVENOUS; SUBCUTANEOUS at 13:52

## 2022-07-09 RX ADMIN — DEXTROSE AND SODIUM CHLORIDE: 5; 450 INJECTION, SOLUTION INTRAVENOUS at 05:48

## 2022-07-09 RX ADMIN — DONEPEZIL HYDROCHLORIDE 10 MG: 5 TABLET, FILM COATED ORAL at 21:37

## 2022-07-09 RX ADMIN — POLYVINYL ALCOHOL 1 DROP: 14 SOLUTION/ DROPS OPHTHALMIC at 12:08

## 2022-07-09 RX ADMIN — ACETAMINOPHEN 650 MG: 325 TABLET ORAL at 09:44

## 2022-07-09 RX ADMIN — POLYVINYL ALCOHOL 1 DROP: 14 SOLUTION/ DROPS OPHTHALMIC at 09:45

## 2022-07-09 RX ADMIN — INSULIN LISPRO 2 UNITS: 100 INJECTION, SOLUTION INTRAVENOUS; SUBCUTANEOUS at 16:51

## 2022-07-09 RX ADMIN — INSULIN GLARGINE 10 UNITS: 100 INJECTION, SOLUTION SUBCUTANEOUS at 21:39

## 2022-07-09 RX ADMIN — POLYVINYL ALCOHOL 1 DROP: 14 SOLUTION/ DROPS OPHTHALMIC at 21:37

## 2022-07-09 RX ADMIN — TRAMADOL HYDROCHLORIDE 50 MG: 50 TABLET, COATED ORAL at 13:52

## 2022-07-09 RX ADMIN — METOPROLOL SUCCINATE 12.5 MG: 25 TABLET, EXTENDED RELEASE ORAL at 09:44

## 2022-07-09 RX ADMIN — TAMSULOSIN HYDROCHLORIDE 0.4 MG: 0.4 CAPSULE ORAL at 09:44

## 2022-07-09 RX ADMIN — INSULIN LISPRO 1 UNITS: 100 INJECTION, SOLUTION INTRAVENOUS; SUBCUTANEOUS at 21:39

## 2022-07-09 RX ADMIN — INSULIN LISPRO 8 UNITS: 100 INJECTION, SOLUTION INTRAVENOUS; SUBCUTANEOUS at 12:07

## 2022-07-09 ASSESSMENT — PAIN SCALES - GENERAL
PAINLEVEL_OUTOF10: 0
PAINLEVEL_OUTOF10: 0

## 2022-07-09 ASSESSMENT — PAIN SCALES - WONG BAKER
WONGBAKER_NUMERICALRESPONSE: 0
WONGBAKER_NUMERICALRESPONSE: 0

## 2022-07-09 NOTE — PROGRESS NOTES
MD Juliette Barajas MD Lanney Saunders, MD               Office: [phone]                      Fax: [phone]              [facility]     Progress note    PATIENT NAME: Sukhdev Jung  : 1926  MRN: [id number]  REASON FOR CONSULT: For evaluation and management of Acute Kidney Injury. (My recommendations will be communicated by way of shared medical record.)        Acute kidney injury-moderate to severe-slow improvement-.  - Urine output improving.  - BUN is 61 and a creatinine of 2.1.  - On admission creatinine is 3.4.  - Etiology likely multifactorial.  - Most likely related to previous. --Cannot exclude ATN.  - Get a bedside bladder scan to exclude any bladder outlet obstruction. May have underlying chronic kidney disease. Multiple electrolyte imbalance-improving with improved urine output. - Potassium has improved to-3.9.  - Hypernatremia-slow improvement-. Hypovolemia improving. Medications reviewed. All nephrotoxic medications have been discontinued. Hold Ace inhibitors till renal recovery is complete. .    Not ready for discharge today. Severe acute renal failure. - Anticipate additional 24 to 48 hours of hospital stay. Hypotension-improving.  -  Volume status improving with IV fluids.  - Recommend to decrease IV fluid to 100 mL/h. - Avoid fluid overload due to advanced age. - Get a chest x-ray in am   -  High complexity. Patient is critically ill.   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, on chronic dialysis, unspecified acute renal failure type ([address] [N17.9, N18.9, Z99.2]      PAMELA (on CKD: 3A):   - BL Scr-  1.2-1.3 as off 2018  ---> 3.4 on admission  -: Etiology of PAMELA - presumed pre-renal   - other differentials: unlikely ATN or  GN / TI / TMA process  - UA : results reviewed: relatively bland = pre-renal       Associated problems:   - Volume status: hypo-volemic  : BP: no need for tight control   : Na: hypernatremia - mild dehydration     - Azotemia: pre-renal   - Electrolytes: K: WNL  - Acid-Base: WNL  - Anemia: likely of CKD - mild        Other major problems: Management per primary and other consulting teams.   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 - Nephrotoxic medications have been discontinued. - Dose adjusted and appropriate. - Dose meds for eGFR <15 mL/min/1.73m2 during APMELA    - Avoid heavy opioids due to renal failure - may use very low dose dilaudid / fentanyl with close monitoring of CNS and respiratory depression. Please refer to the orders. High Complexity. Multiple complex problems. Discussed with patient 's treatment team-   Time spent > 30 (~35) minutes. Thank you for allowing me to participate in this patient's care. Please do not hesitate to contact me anytime. We will follow along with you.        Broa Leo MD,  [facility] [address] [place]: [phone] or [address] [employer]  Fax: [phone]        =======================================================================================   =======================================================================================      CHIEF COMPLAINT:   Chief Complaint   Patient presents with    Other     Pt srrived via [facility] from [address] home w c/o abnormal labs. History Obtained From:  reason patient could not give history:  altered mental status + treatment team + Electronic Medical Records    HPI: Mr. Brooks Burton is a 80 y.o. male with significant past medical history as below:   Past Medical History:   Diagnosis Date    Age-related physical debility     Anxiety disorder due to known physiological condition     Anxiety disorder, unspecified     Benign prostatic hyperplasia without lower urinary tract symptoms     Bradycardia, unspecified     Cardiomyopathy ([facility] [address]     Diabetes mellitus ([address]     Dizziness and giddiness     Dysphagia, oropharyngeal phase     Hyperlipidemia     Hypertension     Major depressive disorder, recurrent, moderate (HCC)     Mild cognitive impairment, so stated     Muscle weakness (generalized)     Unspecified dementia without behavioral disturbance (HCC)     Unspecified systolic (congestive) heart failure (HCC)     Unsteadiness on feet       Presents with Other (Pt srrived via [facility] from [address] home w c/o abnormal labs.)    Admitted with Dehydration [E86.0]  Elevated troponin [R77.8]  PAMELA (acute kidney injury) ([facility] [address] [N17.9]  Constipation, unspecified constipation type [K59.00]  Acute renal failure superimposed on chronic kidney disease, on chronic dialysis, unspecified acute renal failure type ([facility] [address] [N17.9, N18.9, Z99.2]   Found to have elevated Cr, hypernatremia  , so we are called for that. *  Regarding: PAMELA on CKD  · Duration: acute on chronic  · Location: kidneys  · Severity: Severe   · Timing: continous  · Context (ex: related to condition):  , refer to my assessment / impression. · Modifying factors (ex: medications, interventions):  , refer to my plan / recommendation.   · Associated signs & symptoms (ex: edema, SOB): As mentioned above in CC and HPI      Past medical, Surgical, Social, Family medical history reviewed by me. 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     Anxiety disorder, unspecified     Benign prostatic hyperplasia without lower urinary tract symptoms     Bradycardia, unspecified     Cardiomyopathy ([facility] [address]     Diabetes mellitus ([facility] [address]     Dizziness and giddiness     Dysphagia, oropharyngeal phase     Hyperlipidemia     Hypertension     Major depressive disorder, recurrent, moderate (HCC)     Mild cognitive impairment, so stated     Muscle weakness (generalized)     Unspecified dementia without behavioral disturbance (HCC)     Unspecified systolic (congestive) heart failure (HCC)     Unsteadiness on feet      PAST SURGICAL HISTORY: History reviewed. No pertinent surgical history. FAMILY HISTORY: History reviewed. No pertinent family history. SOCIAL HISTORY:   Social History     Socioeconomic History    Marital status:      Spouse name: None    Number of children: None    Years of education: None    Highest education level: None   Occupational History    None   Tobacco Use    Smoking status: Never Smoker    Smokeless tobacco: Never Used   Vaping Use    Vaping Use: Never used   Substance and Sexual Activity    Alcohol use: Not Currently    Drug use: Not Currently    Sexual activity: Not Currently   Other Topics Concern    None   Social History Narrative    None     Social Determinants of Health     Financial Resource Strain:     Difficulty of Paying Living Expenses: Not on file   Food Insecurity:     Worried About Running Out of Food in the Last Year: Not on file    Enriqueta of Food in the Last Year: Not on file   Transportation Needs:     Lack of Transportation (Medical): Not on file    Lack of Transportation (Non-Medical):  Not on file   Physical Activity:     Days of Exercise per Week: Not on file    Minutes of Exercise per Session: Not on file   Stress:     Feeling of Stress : Not on file   Social Connections:     Frequency of Communication with Friends and Family: Not on file    Frequency of Social Gatherings with Friends and Family: Not on file    Attends Scientology Services: Not on file    Active Member of Clubs or Organizations: Not on file    Attends Club or Organization Meetings: Not on file    Marital Status: Not on file   Intimate Partner Violence:     Fear of Current or Ex-Partner: Not on file    Emotionally Abused: Not on file    Physically Abused: Not on file    Sexually Abused: Not on file   Housing Stability:     Unable to Pay for Housing in the Last Year: Not on file    Number of [place] in the Last Year: Not on file    Unstable Housing in the Last Year: Not on file          MEDICATIONS: reviewed by me. Medications Prior to Admission:  No current facility-administered medications on file prior to encounter.      Current Outpatient Medications on File Prior to Encounter   Medication Sig Dispense Refill    acetaminophen (TYLENOL) 500 MG tablet Take 500 mg by mouth nightly Indications: Nerve Disease      mineral oil-hydrophilic petrolatum (AQUAPHOR) ointment Apply topically nightly Apply to feet and legs topically at bedtime      aspirin 81 MG chewable tablet Take 81 mg by mouth daily      Cyanocobalamin (B-12) 1000 MCG CAPS Take by mouth daily      vitamin D (CHOLECALCIFEROL) 25 MCG (1000 UT) TABS tablet Take 1,000 Units by mouth daily      docusate sodium (COLACE) 100 MG capsule Take 200 mg by mouth nightly Indications: Constipation      donepezil (ARICEPT) 10 MG tablet Take 10 mg by mouth nightly Indications: Decline in Cognition due to a Brain Disease      Fexofenadine (ALLEGRA) 30 MG dissolvable tablet Take 60 mg by mouth every 8 hours as needed (cough)      finasteride (PROSCAR) 5 MG tablet Take 5 mg by mouth daily      tamsulosin (FLOMAX) 0.4 MG capsule Take 0.4 mg by mouth daily  furosemide (LASIX) 40 MG tablet Take 40 mg by mouth daily      lidocaine (XYLOCAINE) 5 % ointment Apply topically every 8 hours as needed for Pain Apply to feet/toes as needed.       metoprolol succinate (TOPROL XL) 25 MG extended release tablet Take 12.5 mg by mouth daily Hold for SBP <110 or HR <60      Multiple Vitamins-Minerals (THERAPEUTIC MULTIVITAMIN-MINERALS) tablet Take 1 tablet by mouth daily      calcium-vitamin D (OSCAL-500) 500-200 MG-UNIT per tablet Take 1 tablet by mouth 2 times daily      senna (SENOKOT) 8.6 MG tablet Take 1 tablet by mouth daily as needed for Constipation      senna (SENOKOT) 8.6 MG tablet Take 2 tablets by mouth daily as needed for Constipation      SITagliptin (JANUVIA) 25 MG tablet Take 25 mg by mouth daily      polyethyl glycol-propyl glycol 0.4-0.3 % (SYSTANE) 0.4-0.3 % ophthalmic solution 1 drop 4 times daily Instill one drop in both eyes daily      acetaminophen (TYLENOL) 325 MG tablet Take 650 mg by mouth every 6 hours as needed for Pain or Fever      sertraline (ZOLOFT) 100 MG tablet Take 100 mg by mouth daily      sertraline (ZOLOFT) 25 MG tablet Take 25 mg by mouth daily           Current Facility-Administered Medications:     insulin lispro (HUMALOG) injection vial 0-12 Units, 0-12 Units, SubCUTAneous, TID WC, Leslie Estrada MD    insulin lispro (HUMALOG) injection vial 0-6 Units, 0-6 Units, SubCUTAneous, Nightly, Leslie Estrada MD    glucose-vitamin C chewable tablet 4 tablet, 4 tablet, Oral, PRN, Leslie Estrada MD    dextrose bolus 10% 125 mL, 125 mL, IntraVENous, PRN **OR** dextrose bolus 10% 250 mL, 250 mL, IntraVENous, PRN, Leslie Estrada MD    glucagon (rDNA) injection 1 mg, 1 mg, IntraMUSCular, PRN, Leslie Estrada MD    dextrose 5 % solution, 100 mL/hr, IntraVENous, PRN, Leslie Estrada MD    dextrose 5 % and 0.45 % sodium chloride infusion, , IntraVENous, Continuous, Flavio Giang MD, Last Rate: 150 mL/hr at 07/09/22 05, New Bag at 07/09/22 0548    heparin (porcine) injection 5,000 Units, 5,000 Units, SubCUTAneous, 3 times per day, Yefri Del Castillo MD, 5,000 Units at 07/09/22 0547    acetaminophen (TYLENOL) tablet 650 mg, 650 mg, Oral, Q6H PRN, Yferi Del Castillo MD, 650 mg at 07/09/22 0944    acetaminophen (TYLENOL) tablet 500 mg, 500 mg, Oral, Nightly, Yefri Del Castillo MD, 500 mg at 07/08/22 2214    aspirin chewable tablet 81 mg, 81 mg, Oral, Daily, Yefri Del Castillo MD, 81 mg at 07/09/22 0945    vitamin B-12 (CYANOCOBALAMIN) tablet 1,000 mcg, 1,000 mcg, Oral, Daily, Yefri Del Castillo MD, 1,000 mcg at 07/09/22 0944    docusate sodium (COLACE) capsule 200 mg, 200 mg, Oral, Nightly, Yefri Del Castillo MD, 200 mg at 07/08/22 2214    donepezil (ARICEPT) tablet 10 mg, 10 mg, Oral, Nightly, Yefri Del Castillo MD, 10 mg at 07/08/22 2214    cetirizine (ZYRTEC) tablet 5 mg, 5 mg, Oral, Daily, Yefri Del Castillo MD, 5 mg at 07/09/22 0944    finasteride (PROSCAR) tablet 5 mg, 5 mg, Oral, Daily, Yefri Del Castillo MD, 5 mg at 07/09/22 0944    lidocaine (XYLOCAINE) 5 % ointment, , Topical, Q8H PRN, Yefri Del Castillo MD    metoprolol succinate (TOPROL XL) extended release tablet 12.5 mg, 12.5 mg, Oral, Daily, Yefri Del Castillo MD, 12.5 mg at 07/09/22 7456    dermaphor ointment, , Topical, Nightly, Yefri Del Castillo MD    therapeutic multivitamin-minerals 1 tablet, 1 tablet, Oral, Daily, Yefri Del Castillo MD, 1 tablet at 07/09/22 0944    polyvinyl alcohol (LIQUIFILM TEARS) 1.4 % ophthalmic solution 1 drop, 1 drop, Both Eyes, 4x Daily, Yefri Del Castillo MD, 1 drop at 07/09/22 0945    senna (SENOKOT) tablet 8.6 mg, 1 tablet, Oral, Nightly, Yefri Del Castillo MD, 8.6 mg at 07/08/22 2216    sertraline (ZOLOFT) tablet 100 mg, 100 mg, Oral, Daily, Yefri Del Castillo MD, 100 mg at 07/09/22 0944    alogliptin (NESINA) tablet 6.25 mg, 6.25 mg, Oral, Daily, Yefri Del Castillo MD, 6.25 mg at 07/09/22 0945    tamsulosin (FLOMAX) capsule 0.4 mg, 0.4 mg, Oral, Daily, Seb Goldman MD, 0.4 mg at 07/09/22 0944    vitamin D (CHOLECALCIFEROL) tablet 1,000 Units, 1,000 Units, Oral, Daily, Seb Goldman MD, 1,000 Units at 07/09/22 0944      Allergies reviewed by me: Amitriptyline, Gabapentin, Mercurochrome [merbromin], Mercury, and Merthiolate glycerite [thimerosal]    REVIEW OF SYSTEMS:Review of systems not obtained due to patient factors - mental status          =======================================================================================     PHYSICAL EXAM:  Recent vital signs and recent I/Os reviewed by me. Wt Readings from Last 3 Encounters:   07/09/22 151 lb 7.3 oz (68.7 kg)     BP Readings from Last 3 Encounters:   07/09/22 (!) 108/58   04/01/22 128/80     Patient Vitals for the past 24 hrs:   BP Temp Temp src Pulse Resp SpO2 Height Weight   07/09/22 0931 (!) 108/58 98.3 °F (36.8 °C) Oral 75 16 100 % -- --   07/09/22 0551 -- -- -- -- -- -- -- 151 lb 7.3 oz (68.7 kg)   07/09/22 0000 103/68 98.1 °F (36.7 °C) Oral 70 16 95 % -- --   07/08/22 2215 101/74 98.8 °F (37.1 °C) Oral 77 16 96 % -- --   07/08/22 2000 106/74 98.1 °F (36.7 °C) Oral 72 16 95 % -- --   07/08/22 1615 108/65 97.7 °F (36.5 °C) Oral 74 16 100 % -- --   07/08/22 1400 -- -- -- -- -- -- 6' (1.829 m) --   07/08/22 1214 98/63 98.5 °F (36.9 °C) Oral 76 16 99 % -- --       Intake/Output Summary (Last 24 hours) at 7/9/2022 1147  Last data filed at 7/9/2022 1014  Gross per 24 hour   Intake 1440 ml   Output 1350 ml   Net 90 ml         Physical Exam  Vitals reviewed. Constitutional:       General: He is not in acute distress. Appearance: He is ill-appearing. HENT:      Head: Normocephalic and atraumatic. Right Ear: External ear normal.      Left Ear: External ear normal.      Nose: Nose normal.      Mouth/Throat:      Mouth: Mucous membranes are dry. Eyes:      General: No scleral icterus. Conjunctiva/sclera: Conjunctivae normal.   Neck:      Vascular: No JVD.    Cardiovascular: Troponin:    Lab Results   Component Value Date/Time    TROPONINI 0.22 07/07/2022 10:31 AM    TROPONINI 0.30 07/07/2022 08:05 AM       U/A:    Lab Results   Component Value Date/Time    COLORU Yellow 07/07/2022 08:05 AM    PROTEINU TRACE 07/07/2022 08:05 AM    PHUR 5.0 07/07/2022 08:05 AM    WBCUA 0-2 07/07/2022 08:05 AM    RBCUA None seen 07/07/2022 08:05 AM    BACTERIA None Seen 07/07/2022 08:05 AM    CLARITYU Clear 07/07/2022 08:05 AM    SPECGRAV 1.015 07/07/2022 08:05 AM    LEUKOCYTESUR TRACE 07/07/2022 08:05 AM    UROBILINOGEN 0.2 07/07/2022 08:05 AM    BILIRUBINUR Negative 07/07/2022 08:05 AM    BLOODU Negative 07/07/2022 08:05 AM    GLUCOSEU Negative 07/07/2022 08:05 AM     Microalbumen/Creatinine ratio:  No components found for: RUCREAT  24 Hour Urine for Protein:  No components found for: RAWUPRO, UHRS3, LLKU96BQ, UTV3  24 Hour Urine for Creatinine Clearance:  No components found for: CREAT4, UHRS10, UTV10  Urine Toxicology:  No components found for: IAMMENTA, IBARBIT, IBENZO, ICOCAINE, IMARTHC, IOPIATES, IPHENCYC    HgBA1c:  No results found for: LABA1C  RPR:  No results found for: RPR  HIV:  No results found for: HIV  SIMA:  No results found for: ANATITER, SIMA  RF:  No results found for: RF  DSDNA:  No components found for: DNA  AMYLASE:  No results found for: AMYLASE  LIPASE:  No results found for: LIPASE  Fibrinogen Level:  No components found for: FIB       BELOW MENTIONED RADIOLOGY STUDY RESULTS BY ME (AS NEEDED FOR MY EVALUATION AND MANAGEMENT). CT ABDOMEN PELVIS WO CONTRAST Additional Contrast? None    Result Date: 7/7/2022  EXAMINATION: CT OF THE ABDOMEN AND PELVIS WITHOUT CONTRAST 7/7/2022 8:03 am TECHNIQUE: CT of the abdomen and pelvis was performed without the administration of intravenous contrast. Multiplanar reformatted images are provided for review.  Automated exposure control, iterative reconstruction, and/or weight based adjustment of the mA/kV was utilized to reduce the radiation dose CT OF THE HEAD WITHOUT CONTRAST  7/7/2022 8:03 am TECHNIQUE: CT of the head was performed without the administration of intravenous contrast. Automated exposure control, iterative reconstruction, and/or weight based adjustment of the mA/kV was utilized to reduce the radiation dose to as low as reasonably achievable. COMPARISON: 04/01/2022 HISTORY: ORDERING SYSTEM PROVIDED HISTORY: weakness TECHNOLOGIST PROVIDED HISTORY: Has a \"code stroke\" or \"stroke alert\" been called? ->No Reason for exam:->weakness Decision Support Exception - unselect if not a suspected or confirmed emergency medical condition->Emergency Medical Condition (MA) Reason for Exam: weakness FINDINGS: BRAIN/VENTRICLES: Generalized cerebral and cerebellar atrophy. Prominence of the ventricular system is similar to prior. Moderate periventricular white matter hypodensity is seen bilaterally. Bilateral basal ganglia calcifications. No mass effect or midline shift. No gross acute hemorrhage. ORBITS: Postoperative changes of the globes. SINUSES: Mucosal thickening of the ethmoid air cells though as a whole, sinuses are aerated. SOFT TISSUES/SKULL:  No acute abnormality of the visualized skull or soft tissues. Atrophy and moderate small vessel ischemic disease. RECOMMENDATIONS: Unavailable     XR CHEST PORTABLE    Result Date: 7/7/2022  EXAMINATION: ONE XRAY VIEW OF THE CHEST 7/7/2022 8:00 am COMPARISON: 04/01/2022 HISTORY: ORDERING SYSTEM PROVIDED HISTORY: decreased oral intake, weakness, tachycardia TECHNOLOGIST PROVIDED HISTORY: Reason for exam:->decreased oral intake, weakness, tachycardia FINDINGS: Cardiac leads project over the chest.  Treadmill is again seen projecting at the upper left chest.  No confluent airspace disease. No pleural effusion or pneumothorax. Cardiac and mediastinal silhouettes are unchanged. Calcification of aorta. No acute airspace disease.

## 2022-07-09 NOTE — PROGRESS NOTES
This RN and MD attempting to reach pt's next of kin. No one answering from numbers listed in chart. Rhett called, requesting family contact numbers. [employer] stated they would call RN back and look into it.

## 2022-07-09 NOTE — FLOWSHEET NOTE
Shift assessment completed as charted. Patient more alert this shift able to request orange juice several times thru night, reports this is his \"favorite\". Continues to sleep mostly but awakens easily to voice. Continues to receive IVF's as ordered and tolerating without difficuty. Remains with [place] in place urine output adequate at this time. Palliative consult in place to discuss Goals of care. Vital signs stable, Afebrile. Call light within reach. Bed in  Low position.

## 2022-07-09 NOTE — PLAN OF CARE
Problem: Pain  Goal: Verbalizes/displays adequate comfort level or baseline comfort level  Outcome: Not Progressing  Flowsheets  Taken 7/9/2022 0000 by Ada Reynolds RN  Verbalizes/displays adequate comfort level or baseline comfort level:   Encourage patient to monitor pain and request assistance   Assess pain using appropriate pain scale   Administer analgesics based on type and severity of pain and evaluate response   Implement non-pharmacological measures as appropriate and evaluate response   Consider cultural and social influences on pain and pain management   Notify Licensed Independent Practitioner if interventions unsuccessful or patient reports new pain  Taken 7/8/2022 2215 by Ada Reynolds RN  Verbalizes/displays adequate comfort level or baseline comfort level:   Encourage patient to monitor pain and request assistance   Assess pain using appropriate pain scale   Administer analgesics based on type and severity of pain and evaluate response   Implement non-pharmacological measures as appropriate and evaluate response   Consider cultural and social influences on pain and pain management   Notify Licensed Independent Practitioner if interventions unsuccessful or patient reports new pain     Problem: Nutrition Deficit:  Goal: Optimize nutritional status  Outcome: Not Progressing     Problem: Skin/Tissue Integrity  Goal: Absence of new skin breakdown  Description: 1. Monitor for areas of redness and/or skin breakdown  2. Assess vascular access sites hourly  3. Every 4-6 hours minimum:  Change oxygen saturation probe site  4. Every 4-6 hours:  If on nasal continuous positive airway pressure, respiratory therapy assess nares and determine need for appliance change or resting period.   Outcome: Progressing     Problem: Safety - Adult  Goal: Free from fall injury  Outcome: Progressing     Problem: ABCDS Injury Assessment  Goal: Absence of physical injury  Outcome: Progressing     Problem: Chronic Conditions and Co-morbidities  Goal: Patient's chronic conditions and co-morbidity symptoms are monitored and maintained or improved  Outcome: Progressing

## 2022-07-09 NOTE — CONSULTS
Urology Consult Note  [facility]     Patient: Juanjo Wyatt MRN: [id number]  Room/Bed: [facility]6437/0836-41   YOB: 1926  Age/Sex: 80 y.o.male  Admission Date: 7/7/2022     Date of Service:  7/9/2022    Consulting Physician: Lin Mancia MD  Admitting/Requesting Physician: Janay Hui MD  Primary Care Physician: Elvira Andre MD    Reason for Consult: Elevated PSA    ASSESSMENT/PLAN     Elevated PSA 48  Admission with multiple medical issues including poor oral intake, fatigue, acute kidney injury on CKD  - currently with Westbrook catheter draining light pink urine  - CT without evidence of metastatic prostate cancer  No urologic history per chart  Patient is very confused and rectal exam was declined by patient, difficult to perform  exam as patient asked to leave as was examining catheter  Past medical history significant for dementia, type 2 diabetes, hypertension, CHF    Recommendations:  Elevated PSA in setting of recent catheter placement with concern for prostate cancer, typically screening is not performed at this patient's age, no prior PSA values on chart for comparison and reassuring CT scan, concern for prostate cancer but at the age of 80 with significant comorbidities would hold on biopsy and treatment at this time, will follow-up as outpatient to consider empiric androgen deprivation therapy balancing the risk and benefits of this treatment for the patient but hold on biopsy for now with close outpatient follow-up pending hospital course  Westbrook catheter per primary team  Urology follow-up as requested    All patient questions were answered. He understands the plan as listed above. HISTORY     Chief Complaint:   Chief Complaint   Patient presents with    Other     Pt srrived via [facility] from [address] home w c/o abnormal labs.        History of Present Illness: Juanjo Wyatt is a 80 y.o. male with elevated PSA and admission with poor oral intake and PAMELA. Onset of symptoms was gradual with worsening course since that time. Symptoms are aggravated by no symptoms associated with PSA but does have catheter with some penile discomfort. Symptoms improved with nothing. Associated symptoms include none. Patient also reports no urologic history but is poor historian with significant dementia. He has tried the following treatments: Catheter placement    Past Medical History:  He has a past medical history of Age-related physical debility, Anxiety disorder due to known physiological condition, Anxiety disorder, unspecified, Benign prostatic hyperplasia without lower urinary tract symptoms, Bradycardia, unspecified, Cardiomyopathy ([address] Diabetes mellitus ([address] Dizziness and giddiness, Dysphagia, oropharyngeal phase, Hyperlipidemia, Hypertension, Major depressive disorder, recurrent, moderate ([address] Mild cognitive impairment, so stated, Muscle weakness (generalized), Unspecified dementia without behavioral disturbance ([address] Unspecified systolic (congestive) heart failure ([address] and Unsteadiness on feet. Hospital Problem List:  Principal Problem:    Acute renal failure superimposed on chronic kidney disease, on chronic dialysis ([address]  Active Problems:    Failure to thrive (child)    Severe protein-calorie malnutrition (HCC)    Vascular dementia (HCC)    Elevated troponin    Elevated PSA    Hypotension  Resolved Problems:    * No resolved hospital problems. *      Past Surgical History:  He has no past surgical history on file. Social History:  He reports that he has never smoked. He has never used smokeless tobacco. He reports previous alcohol use. He reports previous drug use. Family History:  family history is not on file. Allergies:   Allergies   Allergen Reactions    Amitriptyline     Gabapentin     Mercurochrome [Merbromin]     Mercury     Merthiolate Glycerite [Thimerosal]        Medications:  Scheduled Meds:   insulin lispro  0-12 Units SubCUTAneous TID     insulin lispro  0-6 Units SubCUTAneous Nightly    heparin (porcine)  5,000 Units SubCUTAneous 3 times per day    acetaminophen  500 mg Oral Nightly    aspirin  81 mg Oral Daily    vitamin B-12  1,000 mcg Oral Daily    docusate sodium  200 mg Oral Nightly    donepezil  10 mg Oral Nightly    cetirizine  5 mg Oral Daily    finasteride  5 mg Oral Daily    metoprolol succinate  12.5 mg Oral Daily    dermaphor   Topical Nightly    therapeutic multivitamin-minerals  1 tablet Oral Daily    polyvinyl alcohol  1 drop Both Eyes 4x Daily    senna  1 tablet Oral Nightly    sertraline  100 mg Oral Daily    alogliptin  6.25 mg Oral Daily    tamsulosin  0.4 mg Oral Daily    Vitamin D  1,000 Units Oral Daily     Continuous Infusions:   dextrose      dextrose 5 % and 0.45 % NaCl 100 mL/hr at 07/09/22 1211     PRN Meds:dextrose bolus **OR** dextrose bolus, glucagon (rDNA), dextrose, traMADol, acetaminophen, lidocaine    Review of Systems:  Unable to perform secondary to patient's dementia and confusion    PHYSICAL EXAM     Vitals:    07/09/22 1150   BP: 119/69   Pulse: 85   Resp: 18   Temp: 99.6 °F (37.6 °C)   SpO2: 97%     CONSTITUTIONAL: The patient is elderly and chronically ill-appearing, with no distress noted. NEUROLOGICAL/PSYCHIATRIC: Not oriented to place and time, normal affected noted. NECK: The neck is symmetrical and supple, with no masses noted. CARDIOVASCULAR: Regular rate and rhythm, no evidence of swelling noted. RESPIRATORY: Normal respiratory effort with no wheezing noted. ABDOMEN: Abdomen soft, non-tender, non-distended. No enlarged liver or spleen. No hernias noted. Stool occult blood not indicated. SKIN: Skin appears normal.  LYMPHATICS: No adenopathy noted. CVA: No CVA tenderness bilaterally. GENITOURINARY: The penis is without rash or lesions and meatus with expected size and location.  The scrotum appears normal. Bilateral testicles appears to be of normal size and location. No masses or tenderness noted of testicles or epididymis. DARIANA: Deferred per patient did not let physician perform  Westbrook light pink urine    Ins/Outs:    Intake/Output Summary (Last 24 hours) at 7/9/2022 1327  Last data filed at 7/9/2022 1014  Gross per 24 hour   Intake 1440 ml   Output 1350 ml   Net 90 ml       LABS     CBC   Lab Results   Component Value Date/Time    WBC 8.4 07/07/2022 08:05 AM    RBC 4.27 07/07/2022 08:05 AM    HGB 13.1 07/07/2022 08:05 AM    HCT 41.2 07/07/2022 08:05 AM    MCV 96.6 07/07/2022 08:05 AM    MCH 30.7 07/07/2022 08:05 AM    MCHC 31.8 07/07/2022 08:05 AM    RDW 15.9 07/07/2022 08:05 AM     07/07/2022 08:05 AM    MPV 9.0 07/07/2022 08:05 AM     BMP   Lab Results   Component Value Date/Time     07/09/2022 06:03 AM    K 3.9 07/09/2022 06:03 AM    K 4.7 07/07/2022 08:05 AM     07/09/2022 06:03 AM    CO2 28 07/09/2022 06:03 AM    BUN 61 07/09/2022 06:03 AM    CREATININE 2.1 07/09/2022 06:03 AM    GLUCOSE 304 07/09/2022 06:03 AM    CALCIUM 8.5 07/09/2022 06:03 AM     Urinalysis:   Lab Results   Component Value Date/Time    COLORU Yellow 07/07/2022 08:05 AM    GLUCOSEU Negative 07/07/2022 08:05 AM    BLOODU Negative 07/07/2022 08:05 AM    NITRU Negative 07/07/2022 08:05 AM    LEUKOCYTESUR TRACE 07/07/2022 08:05 AM     Urine culture: No results for input(s): LABURIN in the last 72 hours. PSA:   Lab Results   Component Value Date    PSA 48.88 (H) 07/07/2022         IMAGING     CT ABDOMEN PELVIS WO CONTRAST Additional Contrast? None    Result Date: 7/7/2022  EXAMINATION: CT OF THE ABDOMEN AND PELVIS WITHOUT CONTRAST 7/7/2022 8:03 am TECHNIQUE: CT of the abdomen and pelvis was performed without the administration of intravenous contrast. Multiplanar reformatted images are provided for review.  Automated exposure control, iterative reconstruction, and/or weight based adjustment of the mA/kV was utilized to reduce the radiation dose to as low as reasonably achievable. COMPARISON: None. HISTORY: ORDERING SYSTEM PROVIDED HISTORY: arabella, no abd pain TECHNOLOGIST PROVIDED HISTORY: Reason for exam:->arabella, no abd pain Additional Contrast?->None Decision Support Exception - unselect if not a suspected or confirmed emergency medical condition->Emergency Medical Condition (MA) Reason for Exam: arabella, no abd pain FINDINGS: Lower Chest: Trace left pleural effusion. Mild bibasilar atelectasis or fibrosis. Organs: Lack of IV and oral contrast limits sensitivity for visceral organ pathology. Within the kidneys bilaterally, no jc calculi. No hydronephrosis or perinephric stranding. No ureteral calculus. 1.4 cm fluid density lesion at the hepatic dome, Hounsfield units 13, likely cyst.  Liver demonstrates a nodular surface. Layering density within the gallbladder, compatible with sludge. Common bile duct is approximately 9 mm in caliber, likely related to patient age. Spleen is within normal limits. Stomach is decompressed. No pancreatic ductal dilatation or stranding. Thickening of the adrenal glands, maintaining adrenal shape. Calcification of the abdominal aorta and iliac arteries. Shotty retroperitoneal lymph nodes. GI/Bowel: Large stool ball is seen at the rectum measuring approximately 8.6 x 7.2 cm. Diverticulosis. Appendix is within normal limits in caliber. Small bowel is nondilated. Pelvis: Bladder is decompressed, with Westbrook catheter. No inguinal adenopathy. Peritoneum/Retroperitoneum: No free air. Bones/Soft Tissues: Metallic fragment is demonstrated at the right parasymphyseal pubic bone. Heterotopic ossification adjacent to the proximal right femur. Degenerative change throughout the spine. No evidence of obstructive uropathy. Large stool ball at the rectum; correlate for fecal impaction. Nodular contour of the liver surface; correlate for cirrhosis. Gallbladder sludge.      CT HEAD WO CONTRAST    Result Date: 7/7/2022  EXAMINATION: CT OF THE HEAD WITHOUT CONTRAST  7/7/2022 8:03 am TECHNIQUE: CT of the head was performed without the administration of intravenous contrast. Automated exposure control, iterative reconstruction, and/or weight based adjustment of the mA/kV was utilized to reduce the radiation dose to as low as reasonably achievable. COMPARISON: 04/01/2022 HISTORY: ORDERING SYSTEM PROVIDED HISTORY: weakness TECHNOLOGIST PROVIDED HISTORY: Has a \"code stroke\" or \"stroke alert\" been called? ->No Reason for exam:->weakness Decision Support Exception - unselect if not a suspected or confirmed emergency medical condition->Emergency Medical Condition (MA) Reason for Exam: weakness FINDINGS: BRAIN/VENTRICLES: Generalized cerebral and cerebellar atrophy. Prominence of the ventricular system is similar to prior. Moderate periventricular white matter hypodensity is seen bilaterally. Bilateral basal ganglia calcifications. No mass effect or midline shift. No gross acute hemorrhage. ORBITS: Postoperative changes of the globes. SINUSES: Mucosal thickening of the ethmoid air cells though as a whole, sinuses are aerated. SOFT TISSUES/SKULL:  No acute abnormality of the visualized skull or soft tissues. Atrophy and moderate small vessel ischemic disease. RECOMMENDATIONS: Unavailable     US RENAL COMPLETE    Result Date: 7/8/2022  EXAMINATION: RETROPERITONEAL ULTRASOUND OF THE KIDNEYS AND URINARY BLADDER 7/8/2022 COMPARISON: CT abdomen and pelvis 07/07/2022. HISTORY: ORDERING SYSTEM PROVIDED HISTORY: acute on chronic kidney disease TECHNOLOGIST PROVIDED HISTORY: Reason for exam:->acute on chronic kidney disease Reason for Exam: acute or chronic kidney disease FINDINGS: Kidneys: The right kidney measures 9.9 cm in length and the left kidney measures 9.6 cm in length. Kidneys demonstrate normal cortical echogenicity. No evidence of hydronephrosis or intrarenal stones. Bladder: A Westbrook catheter is in place in the urinary bladder is decompressed. Unremarkable ultrasound of the kidneys and urinary bladder. XR CHEST PORTABLE    Result Date: 7/7/2022  EXAMINATION: ONE XRAY VIEW OF THE CHEST 7/7/2022 8:00 am COMPARISON: 04/01/2022 HISTORY: ORDERING SYSTEM PROVIDED HISTORY: decreased oral intake, weakness, tachycardia TECHNOLOGIST PROVIDED HISTORY: Reason for exam:->decreased oral intake, weakness, tachycardia FINDINGS: Cardiac leads project over the chest.  Treadmill is again seen projecting at the upper left chest.  No confluent airspace disease. No pleural effusion or pneumothorax. Cardiac and mediastinal silhouettes are unchanged. Calcification of aorta. No acute airspace disease.             Electronically signed by: Tana Watkins MD MD, LUCY 7/9/2022   [facility]  Office Contact: [phone]

## 2022-07-09 NOTE — PROGRESS NOTES
[facility]                                            Advanced Care Planning Note. Purpose of Encounter: Advanced care planning in light of advanced age malnutrition dementia and renal Failure  Parties In Attendance: Patient,  Patient's wife Hauser File  [phone]  Decisional Capacity: No but when he was competent he always wanted to be DNR CC  Subjective: Patient/family understand that this conversation is to address long term care goal  Objective:   CPR-No  Mechanical Vent-No  Defibrillation-No   intubation-no   Hemodialysis if indicated-No  Gastrostomy if needed- No   Goals of Care Determination: Patient/POA Thalia Spivey wife and next of kin    Code Status:DNR CC   Time spent on Advanced care Plannin minutes   Advanced Care Planning Documents:   Completed advanced directives on chart, Yesica Huynh the wife  is the POA.     Ivory Martinez MD  2022 2:14 PM

## 2022-07-09 NOTE — PROGRESS NOTES
Department of Internal Medicine  General Internal Medicine   Progress Note      SUBJECTIVE: weak lethargic incoherent  Poor appetite no obvious dyspnea     History obtained from chart review and the patient  General ROS: positive for  - fatigue, malaise and weight loss  negative for - chills, fever or night sweats  Psychological ROS: positive for - anxiety, disorientation, irritability and memory difficulties  negative for - hallucinations or hostility  Ophthalmic ROS: negative  Respiratory ROS: no cough, shortness of breath, or wheezing  Cardiovascular ROS: no chest pain or dyspnea on exertion  Gastrointestinal ROS: no abdominal pain, change in bowel habits, or black or bloody stools  Genito-Urinary ROS: no dysuria, trouble voiding, or hematuria  Musculoskeletal ROS: chronic pain   Neurological ROS: no TIA or stroke symptoms  Dermatological ROS: negative    OBJECTIVE      Medications      Current Facility-Administered Medications: insulin lispro (HUMALOG) injection vial 0-12 Units, 0-12 Units, SubCUTAneous, TID WC  insulin lispro (HUMALOG) injection vial 0-6 Units, 0-6 Units, SubCUTAneous, Nightly  dextrose bolus 10% 125 mL, 125 mL, IntraVENous, PRN **OR** dextrose bolus 10% 250 mL, 250 mL, IntraVENous, PRN  glucagon (rDNA) injection 1 mg, 1 mg, IntraMUSCular, PRN  dextrose 5 % solution, 100 mL/hr, IntraVENous, PRN  traMADol (ULTRAM) tablet 50 mg, 50 mg, Oral, Q12H PRN  dextrose bolus 10% 125 mL, 125 mL, IntraVENous, PRN **OR** dextrose bolus 10% 250 mL, 250 mL, IntraVENous, PRN  glucagon (rDNA) injection 1 mg, 1 mg, IntraMUSCular, PRN  dextrose 5 % solution, 100 mL/hr, IntraVENous, PRN  insulin glargine (LANTUS) injection vial 10 Units, 0.15 Units/kg, SubCUTAneous, Nightly  insulin lispro (HUMALOG) injection vial 0-12 Units, 0-12 Units, SubCUTAneous, TID WC  insulin lispro (HUMALOG) injection vial 0-6 Units, 0-6 Units, SubCUTAneous, Nightly  dextrose 5 % and 0.45 % sodium chloride infusion, , IntraVENous, Continuous  heparin (porcine) injection 5,000 Units, 5,000 Units, SubCUTAneous, 3 times per day  acetaminophen (TYLENOL) tablet 650 mg, 650 mg, Oral, Q6H PRN  acetaminophen (TYLENOL) tablet 500 mg, 500 mg, Oral, Nightly  aspirin chewable tablet 81 mg, 81 mg, Oral, Daily  vitamin B-12 (CYANOCOBALAMIN) tablet 1,000 mcg, 1,000 mcg, Oral, Daily  docusate sodium (COLACE) capsule 200 mg, 200 mg, Oral, Nightly  donepezil (ARICEPT) tablet 10 mg, 10 mg, Oral, Nightly  cetirizine (ZYRTEC) tablet 5 mg, 5 mg, Oral, Daily  finasteride (PROSCAR) tablet 5 mg, 5 mg, Oral, Daily  lidocaine (XYLOCAINE) 5 % ointment, , Topical, Q8H PRN  metoprolol succinate (TOPROL XL) extended release tablet 12.5 mg, 12.5 mg, Oral, Daily  dermaphor ointment, , Topical, Nightly  therapeutic multivitamin-minerals 1 tablet, 1 tablet, Oral, Daily  polyvinyl alcohol (LIQUIFILM TEARS) 1.4 % ophthalmic solution 1 drop, 1 drop, Both Eyes, 4x Daily  senna (SENOKOT) tablet 8.6 mg, 1 tablet, Oral, Nightly  sertraline (ZOLOFT) tablet 100 mg, 100 mg, Oral, Daily  alogliptin (NESINA) tablet 6.25 mg, 6.25 mg, Oral, Daily  tamsulosin (FLOMAX) capsule 0.4 mg, 0.4 mg, Oral, Daily  vitamin D (CHOLECALCIFEROL) tablet 1,000 Units, 1,000 Units, Oral, Daily    Physical      Vitals: /69   Pulse 85   Temp 99.6 °F (37.6 °C) (Oral)   Resp 18   Ht 6' (1.829 m)   Wt 151 lb 7.3 oz (68.7 kg)   SpO2 97%   BMI 20.54 kg/m²   Temp: Temp: 99.6 °F (37.6 °C)  Max: Temp  Av.4 °F (36.9 °C)  Min: 97.7 °F (36.5 °C)  Max: 99.6 °F (37.6 °C)  Respiration range:  Resp  Av.3  Min: 16  Max: 18  Pulse Range:  Pulse  Av.5  Min: 70  Max: 85  Blood pressure range:  Systolic (25SYX), SLW:352 , Min:101 , UQQ:440   , Diastolic (43RYB), IIB:87, Min:58, Max:74    SpO2  Av.2 %  Min: 95 %  Max: 100 %    Intake/Output Summary (Last 24 hours) at 2022 1418  Last data filed at 2022 1403  Gross per 24 hour   Intake 4419.53 ml   Output 1350 ml   Net 3069.53 ml       Vent settings:  Pulse  Av.5  Min: 61  Max: 121  Resp  Av.9  Min: 12  Max: 31  SpO2  Av.5 %  Min: 95 %  Max: 100 %    CONSTITUTIONAL:  fatigued, alert, uncooperative, distracted, mild distress, appears stated age and thin  EYES:  Unremarkable   NECK:  No JVD  and supple, symmetrical, trachea midline  BACK:  symmetric  LUNGS:  no increased work of breathing, moderate air exchange, no retractions and no crackles or wheezing  CARDIOVASCULAR:  normal apical pulses, regular rate and rhythm, normal S1 and S2 and no S3  ABDOMEN:  Soft scaphoid BS +   MUSCULOSKELETAL:  No distal edema , has weak pulsations  NEUROLOGIC:  No acute deficit but has generalized extensive weakness Babinski absent   SKIN:  Warm dry and pale  and no bruising or bleeding    Data      Recent Results (from the past 96 hour(s))   EKG 12 Lead    Collection Time: 22  7:32 AM   Result Value Ref Range    Ventricular Rate 119 BPM    Atrial Rate 93 BPM    QRS Duration 106 ms    Q-T Interval 344 ms    QTc Calculation (Bazett) 483 ms    R Axis -66 degrees    T Axis 95 degrees    Diagnosis       Accelerated Junctional rhythm with retrograde conductionLeft axis deviationLow voltage QRSInferior infarct , age undeterminedCannot rule out anerolateral infarct, age undeterminedConfirmed by Sabine Baer MD, Blaise Johnson ([phone]) on 2022 10:33:29 AM   CBC with Auto Differential    Collection Time: 22  8:05 AM   Result Value Ref Range    WBC 8.4 4.0 - 11.0 K/uL    RBC 4.27 4.20 - 5.90 M/uL    Hemoglobin 13.1 (L) 13.5 - 17.5 g/dL    Hematocrit 41.2 40.5 - 52.5 %    MCV 96.6 80.0 - 100.0 fL    MCH 30.7 26.0 - 34.0 pg    MCHC 31.8 31.0 - 36.0 g/dL    RDW 15.9 (H) 12.4 - 15.4 %    Platelets 091 (L) 880 - 450 K/uL    MPV 9.0 5.0 - 10.5 fL    Neutrophils % 67.7 %    Lymphocytes % 21.8 %    Monocytes % 10.0 %    Eosinophils % 0.3 %    Basophils % 0.2 %    Neutrophils Absolute 5.7 1.7 - 7.7 K/uL    Lymphocytes Absolute 1.8 1.0 - 5.1 K/uL    Monocytes Absolute 0.8 0.0 Base Excess, Pato 2.7 -3.0 - 3.0 mmol/L    O2 Sat, Pato 99 Not Established %    Carboxyhemoglobin 3.1 (H) 0.0 - 1.5 %    MetHgb, Pato 0.1 <1.5 %    TC02 (Calc), Pato 64 Not Established mmol/L    O2 Content, Pato 18 Not Established VOL %    O2 Therapy Unknown    Microscopic Urinalysis    Collection Time: 07/07/22  8:05 AM   Result Value Ref Range    Hyaline Casts, UA 3-5 (A) 0 - 2 /LPF    WBC, UA 0-2 0 - 5 /HPF    RBC, UA None seen 0 - 4 /HPF    Epithelial Cells, UA 2-5 0 - 5 /HPF    Bacteria, UA None Seen None Seen /HPF   Lactate, Sepsis    Collection Time: 07/07/22 10:31 AM   Result Value Ref Range    Lactic Acid, Sepsis 2.5 (H) 0.4 - 1.9 mmol/L   Troponin    Collection Time: 07/07/22 10:31 AM   Result Value Ref Range    Troponin 0.22 (H) <0.01 ng/mL   PSA Screening    Collection Time: 07/07/22 10:31 AM   Result Value Ref Range    PSA 48.88 (H) 0.00 - 4.00 ng/mL   Basic Metabolic Panel    Collection Time: 07/08/22  4:44 AM   Result Value Ref Range    Sodium 147 (H) 136 - 145 mmol/L    Potassium 3.8 3.5 - 5.1 mmol/L    Chloride 106 99 - 110 mmol/L    CO2 26 21 - 32 mmol/L    Anion Gap 15 3 - 16    Glucose 213 (H) 70 - 99 mg/dL    BUN 88 (HH) 7 - 20 mg/dL    CREATININE 2.9 (H) 0.8 - 1.3 mg/dL    GFR Non-African American 20 (A) >60    GFR  25 (A) >60    Calcium 9.5 8.3 - 10.6 mg/dL   Renal Function Panel    Collection Time: 07/08/22  4:44 AM   Result Value Ref Range    Sodium 150 (H) 136 - 145 mmol/L    Potassium 3.9 3.5 - 5.1 mmol/L    Chloride 109 99 - 110 mmol/L    CO2 27 21 - 32 mmol/L    Anion Gap 14 3 - 16    Glucose 204 (H) 70 - 99 mg/dL    BUN 89 (HH) 7 - 20 mg/dL    CREATININE 2.8 (H) 0.8 - 1.3 mg/dL    GFR Non-African American 21 (A) >60    GFR  26 (A) >60    Calcium 9.5 8.3 - 10.6 mg/dL    Phosphorus 3.6 2.5 - 4.9 mg/dL    Albumin 3.6 3.4 - 5.0 g/dL   Electrolytes urine random    Collection Time: 07/08/22 12:30 PM   Result Value Ref Range    Sodium, Ur 32 Not Established mmol/L Potassium, Ur 37.5 Not Established mmol/L    Chloride <20 Not Established mmol/L   Creatinine, Random Urine    Collection Time: 07/08/22 12:30 PM   Result Value Ref Range    Creatinine, Ur 80.4 39.0 - 259.0 mg/dL   Urea nitrogen, urine    Collection Time: 07/08/22 12:30 PM   Result Value Ref Range    Urea Nitrogen, Ur 962.4 800.0 - 1666.0 mg/dL   Legionella antigen, urine    Collection Time: 07/08/22 12:30 PM    Specimen: Urine voided   Result Value Ref Range    L. pneumophila Serogp 1 Ur Ag       Presumptive Negative  No Legionella pneumophila serogroup 1 antigens detected. A negative result does not exclude infection with  Legionella pneumophila serogroup 1 nor does it rule out  other microbial-caused respiratory infections or  disease caused by other serogroups of  Legionella pneumophila.   Normal Range: Presumptive Negative     Renal Function Panel    Collection Time: 07/09/22  6:03 AM   Result Value Ref Range    Sodium 138 136 - 145 mmol/L    Potassium 3.9 3.5 - 5.1 mmol/L    Chloride 102 99 - 110 mmol/L    CO2 28 21 - 32 mmol/L    Anion Gap 8 3 - 16    Glucose 304 (H) 70 - 99 mg/dL    BUN 61 (H) 7 - 20 mg/dL    CREATININE 2.1 (H) 0.8 - 1.3 mg/dL    GFR Non-African American 29 (A) >60    GFR  36 (A) >60    Calcium 8.5 8.3 - 10.6 mg/dL    Phosphorus 1.9 (L) 2.5 - 4.9 mg/dL    Albumin 3.1 (L) 3.4 - 5.0 g/dL   POCT Glucose    Collection Time: 07/09/22 11:47 AM   Result Value Ref Range    POC Glucose 302 (H) 70 - 99 mg/dl    Performed on [address] [place] [address] Problems           Last Modified POA    * (Principal) Acute renal failure superimposed on chronic kidney disease, on chronic dialysis ([facility] [address] 7/7/2022 Yes    Failure to thrive (child) 7/8/2022 Yes    Severe protein-calorie malnutrition ([address] 7/8/2022 Yes    Vascular dementia ([address] [facility] [address] 7/8/2022 Yes    Elevated troponin 7/8/2022 Yes    Elevated PSA 7/8/2022 Yes    Hypotension 7/8/2022 Yes        IV hydration sliding scale insulin    ideally he needs palliative care and Hospice   Multiple tries were not successful in reaching the family

## 2022-07-10 ENCOUNTER — APPOINTMENT (OUTPATIENT)
Dept: GENERAL RADIOLOGY | Age: 87
DRG: 682 | End: 2022-07-10
Payer: COMMERCIAL

## 2022-07-10 LAB
ALBUMIN SERPL-MCNC: 2.9 G/DL (ref 3.4–5)
ANION GAP SERPL CALCULATED.3IONS-SCNC: 6 MMOL/L (ref 3–16)
BUN BLDV-MCNC: 46 MG/DL (ref 7–20)
CALCIUM SERPL-MCNC: 8.5 MG/DL (ref 8.3–10.6)
CHLORIDE BLD-SCNC: 102 MMOL/L (ref 99–110)
CO2: 30 MMOL/L (ref 21–32)
CREAT SERPL-MCNC: 1.7 MG/DL (ref 0.8–1.3)
ESTIMATED AVERAGE GLUCOSE: 145.6 MG/DL
GFR AFRICAN AMERICAN: 45
GFR NON-AFRICAN AMERICAN: 38
GLUCOSE BLD-MCNC: 104 MG/DL (ref 70–99)
GLUCOSE BLD-MCNC: 122 MG/DL (ref 70–99)
GLUCOSE BLD-MCNC: 136 MG/DL (ref 70–99)
GLUCOSE BLD-MCNC: 163 MG/DL (ref 70–99)
GLUCOSE BLD-MCNC: 211 MG/DL (ref 70–99)
HBA1C MFR BLD: 6.7 %
PERFORMED ON: ABNORMAL
PHOSPHORUS: 1.6 MG/DL (ref 2.5–4.9)
POTASSIUM SERPL-SCNC: 3.7 MMOL/L (ref 3.5–5.1)
SODIUM BLD-SCNC: 138 MMOL/L (ref 136–145)

## 2022-07-10 PROCEDURE — 83036 HEMOGLOBIN GLYCOSYLATED A1C: CPT

## 2022-07-10 PROCEDURE — 2580000003 HC RX 258: Performed by: INTERNAL MEDICINE

## 2022-07-10 PROCEDURE — 1200000000 HC SEMI PRIVATE

## 2022-07-10 PROCEDURE — 6370000000 HC RX 637 (ALT 250 FOR IP): Performed by: NURSE PRACTITIONER

## 2022-07-10 PROCEDURE — 80069 RENAL FUNCTION PANEL: CPT

## 2022-07-10 PROCEDURE — 6360000002 HC RX W HCPCS: Performed by: INTERNAL MEDICINE

## 2022-07-10 PROCEDURE — 71045 X-RAY EXAM CHEST 1 VIEW: CPT

## 2022-07-10 PROCEDURE — 6370000000 HC RX 637 (ALT 250 FOR IP): Performed by: INTERNAL MEDICINE

## 2022-07-10 PROCEDURE — 36415 COLL VENOUS BLD VENIPUNCTURE: CPT

## 2022-07-10 RX ORDER — LANOLIN ALCOHOL/MO/W.PET/CERES
3 CREAM (GRAM) TOPICAL NIGHTLY PRN
Status: DISCONTINUED | OUTPATIENT
Start: 2022-07-10 | End: 2022-07-12 | Stop reason: HOSPADM

## 2022-07-10 RX ADMIN — POLYVINYL ALCOHOL 1 DROP: 14 SOLUTION/ DROPS OPHTHALMIC at 22:26

## 2022-07-10 RX ADMIN — CETIRIZINE HYDROCHLORIDE 5 MG: 10 TABLET, FILM COATED ORAL at 09:06

## 2022-07-10 RX ADMIN — SENNOSIDES 8.6 MG: 8.6 TABLET, FILM COATED ORAL at 21:40

## 2022-07-10 RX ADMIN — DEXTROSE AND SODIUM CHLORIDE: 5; 450 INJECTION, SOLUTION INTRAVENOUS at 09:03

## 2022-07-10 RX ADMIN — INSULIN LISPRO 4 UNITS: 100 INJECTION, SOLUTION INTRAVENOUS; SUBCUTANEOUS at 09:07

## 2022-07-10 RX ADMIN — Medication 1000 UNITS: at 09:06

## 2022-07-10 RX ADMIN — ALOGLIPTIN 6.25 MG: 6.25 TABLET, FILM COATED ORAL at 09:07

## 2022-07-10 RX ADMIN — SERTRALINE 100 MG: 50 TABLET, FILM COATED ORAL at 09:07

## 2022-07-10 RX ADMIN — TRAMADOL HYDROCHLORIDE 50 MG: 50 TABLET, COATED ORAL at 21:39

## 2022-07-10 RX ADMIN — LIDOCAINE: 50 OINTMENT TOPICAL at 00:11

## 2022-07-10 RX ADMIN — Medication 1 TABLET: at 09:06

## 2022-07-10 RX ADMIN — DOCUSATE SODIUM 200 MG: 100 CAPSULE, LIQUID FILLED ORAL at 21:40

## 2022-07-10 RX ADMIN — Medication 3 MG: at 21:40

## 2022-07-10 RX ADMIN — TAMSULOSIN HYDROCHLORIDE 0.4 MG: 0.4 CAPSULE ORAL at 09:07

## 2022-07-10 RX ADMIN — HEPARIN SODIUM 5000 UNITS: 5000 INJECTION INTRAVENOUS; SUBCUTANEOUS at 21:39

## 2022-07-10 RX ADMIN — Medication 3 MG: at 00:11

## 2022-07-10 RX ADMIN — CYANOCOBALAMIN TAB 1000 MCG 1000 MCG: 1000 TAB at 09:06

## 2022-07-10 RX ADMIN — DONEPEZIL HYDROCHLORIDE 10 MG: 5 TABLET, FILM COATED ORAL at 21:40

## 2022-07-10 RX ADMIN — TRAMADOL HYDROCHLORIDE 50 MG: 50 TABLET, COATED ORAL at 02:50

## 2022-07-10 RX ADMIN — ACETAMINOPHEN 500 MG: 500 TABLET ORAL at 21:40

## 2022-07-10 RX ADMIN — ASPIRIN 81 MG: 81 TABLET, CHEWABLE ORAL at 09:06

## 2022-07-10 RX ADMIN — INSULIN GLARGINE 10 UNITS: 100 INJECTION, SOLUTION SUBCUTANEOUS at 21:40

## 2022-07-10 RX ADMIN — FINASTERIDE 5 MG: 5 TABLET, FILM COATED ORAL at 09:07

## 2022-07-10 RX ADMIN — POLYVINYL ALCOHOL 1 DROP: 14 SOLUTION/ DROPS OPHTHALMIC at 09:21

## 2022-07-10 RX ADMIN — DEXTROSE AND SODIUM CHLORIDE: 5; 450 INJECTION, SOLUTION INTRAVENOUS at 18:36

## 2022-07-10 RX ADMIN — POLYVINYL ALCOHOL 1 DROP: 14 SOLUTION/ DROPS OPHTHALMIC at 13:02

## 2022-07-10 ASSESSMENT — PAIN SCALES - WONG BAKER: WONGBAKER_NUMERICALRESPONSE: 0

## 2022-07-10 ASSESSMENT — PAIN SCALES - GENERAL
PAINLEVEL_OUTOF10: 0
PAINLEVEL_OUTOF10: 10

## 2022-07-10 NOTE — PLAN OF CARE
Problem: Skin/Tissue Integrity  Goal: Absence of new skin breakdown  Description: 1. Monitor for areas of redness and/or skin breakdown  2. Assess vascular access sites hourly  3. Every 4-6 hours minimum:  Change oxygen saturation probe site  4. Every 4-6 hours:  If on nasal continuous positive airway pressure, respiratory therapy assess nares and determine need for appliance change or resting period.   7/9/2022 2259 by Melissa Ruth RN  Outcome: Progressing  7/9/2022 1024 by Mick Pelayo RN  Outcome: Progressing     Problem: Pain  Goal: Verbalizes/displays adequate comfort level or baseline comfort level  7/9/2022 2259 by Melissa Ruth RN  Outcome: Progressing  7/9/2022 1024 by Mick Pelayo RN  Outcome: Not Progressing  Flowsheets  Taken 7/9/2022 0000 by Natanael Lai RN  Verbalizes/displays adequate comfort level or baseline comfort level:   Encourage patient to monitor pain and request assistance   Assess pain using appropriate pain scale   Administer analgesics based on type and severity of pain and evaluate response   Implement non-pharmacological measures as appropriate and evaluate response   Consider cultural and social influences on pain and pain management   Notify Licensed Independent Practitioner if interventions unsuccessful or patient reports new pain  Taken 7/8/2022 2215 by Natanael Lai RN  Verbalizes/displays adequate comfort level or baseline comfort level:   Encourage patient to monitor pain and request assistance   Assess pain using appropriate pain scale   Administer analgesics based on type and severity of pain and evaluate response   Implement non-pharmacological measures as appropriate and evaluate response   Consider cultural and social influences on pain and pain management   Notify Licensed Independent Practitioner if interventions unsuccessful or patient reports new pain     Problem: Safety - Adult  Goal: Free from fall injury  7/9/2022 2259 by Melissa Ruth

## 2022-07-10 NOTE — FLOWSHEET NOTE
07/09/22 2136   Treatment Team Notification   Reason for Communication Evaluate  (pt code status changed, paper work needs signed)   Team Member Name [employer] Team Role Attending Provider   Method of Communication Secure Message   Response Waiting for response   Notification Time 1942     Pt Code status changed and paperwork needs to be assigned, let attending provider know.

## 2022-07-10 NOTE — PROGRESS NOTES
Department of Internal Medicine  General Internal Medicine   Progress Note      SUBJECTIVE: poor appetite lethargy  Totally incoherent no obvious dyspnea     History obtained from chart review and the patient  General ROS: positive for  - fatigue, malaise and weight loss  negative for - chills, fever or night sweats  Psychological ROS: positive for - anxiety, disorientation, irritability and memory difficulties  negative for - hallucinations or hostility  Ophthalmic ROS: negative  Respiratory ROS: no cough, shortness of breath, or wheezing  Cardiovascular ROS: no chest pain or dyspnea on exertion  Gastrointestinal ROS: no abdominal pain, change in bowel habits, or black or bloody stools  Genito-Urinary ROS: no dysuria, trouble voiding, or hematuria  Musculoskeletal ROS: chronic pain   Neurological ROS: no TIA or stroke symptoms  Dermatological ROS: negative    OBJECTIVE      Medications      Current Facility-Administered Medications: melatonin tablet 3 mg, 3 mg, Oral, Nightly PRN  traMADol (ULTRAM) tablet 50 mg, 50 mg, Oral, Q12H PRN  dextrose bolus 10% 125 mL, 125 mL, IntraVENous, PRN **OR** dextrose bolus 10% 250 mL, 250 mL, IntraVENous, PRN  glucagon (rDNA) injection 1 mg, 1 mg, IntraMUSCular, PRN  dextrose 5 % solution, 100 mL/hr, IntraVENous, PRN  insulin glargine (LANTUS) injection vial 10 Units, 0.15 Units/kg, SubCUTAneous, Nightly  insulin lispro (HUMALOG) injection vial 0-12 Units, 0-12 Units, SubCUTAneous, TID WC  insulin lispro (HUMALOG) injection vial 0-6 Units, 0-6 Units, SubCUTAneous, Nightly  dextrose 5 % and 0.45 % sodium chloride infusion, , IntraVENous, Continuous  heparin (porcine) injection 5,000 Units, 5,000 Units, SubCUTAneous, 3 times per day  acetaminophen (TYLENOL) tablet 650 mg, 650 mg, Oral, Q6H PRN  acetaminophen (TYLENOL) tablet 500 mg, 500 mg, Oral, Nightly  aspirin chewable tablet 81 mg, 81 mg, Oral, Daily  vitamin B-12 (CYANOCOBALAMIN) tablet 1,000 mcg, 1,000 mcg, Oral, symmetric  LUNGS:  no increased work of breathing, moderate air exchange, no retractions and no crackles or wheezing  CARDIOVASCULAR:  normal apical pulses, regular rate and rhythm, normal S1 and S2 and no S3  ABDOMEN:  Soft scaphoid BS +   MUSCULOSKELETAL:  No distal edema , has weak pulsations  NEUROLOGIC:  No acute deficit but has generalized extensive weakness Babinski absent   SKIN:  Warm dry and pale  and no bruising or bleeding    Data      Recent Results (from the past 96 hour(s))   EKG 12 Lead    Collection Time: 07/07/22  7:32 AM   Result Value Ref Range    Ventricular Rate 119 BPM    Atrial Rate 93 BPM    QRS Duration 106 ms    Q-T Interval 344 ms    QTc Calculation (Bazett) 483 ms    R Axis -66 degrees    T Axis 95 degrees    Diagnosis       Accelerated Junctional rhythm with retrograde conductionLeft axis deviationLow voltage QRSInferior infarct , age undeterminedCannot rule out anerolateral infarct, age undeterminedConfirmed by Adam Marroquin MD, Mo Byrne ([id number]) on 7/8/2022 10:33:29 AM   CBC with Auto Differential    Collection Time: 07/07/22  8:05 AM   Result Value Ref Range    WBC 8.4 4.0 - 11.0 K/uL    RBC 4.27 4.20 - 5.90 M/uL    Hemoglobin 13.1 (L) 13.5 - 17.5 g/dL    Hematocrit 41.2 40.5 - 52.5 %    MCV 96.6 80.0 - 100.0 fL    MCH 30.7 26.0 - 34.0 pg    MCHC 31.8 31.0 - 36.0 g/dL    RDW 15.9 (H) 12.4 - 15.4 %    Platelets 140 (L) 806 - 450 K/uL    MPV 9.0 5.0 - 10.5 fL    Neutrophils % 67.7 %    Lymphocytes % 21.8 %    Monocytes % 10.0 %    Eosinophils % 0.3 %    Basophils % 0.2 %    Neutrophils Absolute 5.7 1.7 - 7.7 K/uL    Lymphocytes Absolute 1.8 1.0 - 5.1 K/uL    Monocytes Absolute 0.8 0.0 - 1.3 K/uL    Eosinophils Absolute 0.0 0.0 - 0.6 K/uL    Basophils Absolute 0.0 0.0 - 0.2 K/uL   Comprehensive Metabolic Panel w/ Reflex to MG    Collection Time: 07/07/22  8:05 AM   Result Value Ref Range    Sodium 148 (H) 136 - 145 mmol/L    Potassium reflex Magnesium 4.7 3.5 - 5.1 mmol/L    Chloride 102 99 - 110 mmol/L    CO2 27 21 - 32 mmol/L    Anion Gap 19 (H) 3 - 16    Glucose 159 (H) 70 - 99 mg/dL    BUN 90 (HH) 7 - 20 mg/dL    CREATININE 3.4 (H) 0.8 - 1.3 mg/dL    GFR Non-African American 17 (A) >60    GFR  20 (A) >60    Calcium 9.9 8.3 - 10.6 mg/dL    Total Protein 8.4 (H) 6.4 - 8.2 g/dL    Albumin 4.1 3.4 - 5.0 g/dL    Albumin/Globulin Ratio 1.0 (L) 1.1 - 2.2    Total Bilirubin 0.7 0.0 - 1.0 mg/dL    Alkaline Phosphatase 52 40 - 129 U/L    ALT 9 (L) 10 - 40 U/L    AST 24 15 - 37 U/L   Troponin    Collection Time: 07/07/22  8:05 AM   Result Value Ref Range    Troponin 0.30 (H) <0.01 ng/mL   Urinalysis with Reflex to Culture    Collection Time: 07/07/22  8:05 AM    Specimen: Urine   Result Value Ref Range    Color, UA Yellow Straw/Yellow    Clarity, UA Clear Clear    Glucose, Ur Negative Negative mg/dL    Bilirubin Urine Negative Negative    Ketones, Urine TRACE (A) Negative mg/dL    Specific Gravity, UA 1.015 1.005 - 1.030    Blood, Urine Negative Negative    pH, UA 5.0 5.0 - 8.0    Protein, UA TRACE (A) Negative mg/dL    Urobilinogen, Urine 0.2 <2.0 E.U./dL    Nitrite, Urine Negative Negative    Leukocyte Esterase, Urine TRACE (A) Negative    Microscopic Examination YES     Urine Type NotGiven     Urine Reflex to Culture Not Indicated    Lactate, Sepsis    Collection Time: 07/07/22  8:05 AM   Result Value Ref Range    Lactic Acid, Sepsis 2.4 (H) 0.4 - 1.9 mmol/L   Blood Gas, Venous    Collection Time: 07/07/22  8:05 AM   Result Value Ref Range    pH, Pato 7.428 7.350 - 7.450    pCO2, Pato 41.4 40.0 - 50.0 mmHg    pO2, Pato 116.0 (H) 25.0 - 40.0 mmHg    HCO3, Venous 27.3 23.0 - 29.0 mmol/L    Base Excess, Pato 2.7 -3.0 - 3.0 mmol/L    O2 Sat, Pato 99 Not Established %    Carboxyhemoglobin 3.1 (H) 0.0 - 1.5 %    MetHgb, Pato 0.1 <1.5 %    TC02 (Calc), Pato 64 Not Established mmol/L    O2 Content, Pato 18 Not Established VOL %    O2 Therapy Unknown    Microscopic Urinalysis    Collection Time: 07/07/22  8:05 AM Result Value Ref Range    Hyaline Casts, UA 3-5 (A) 0 - 2 /LPF    WBC, UA 0-2 0 - 5 /HPF    RBC, UA None seen 0 - 4 /HPF    Epithelial Cells, UA 2-5 0 - 5 /HPF    Bacteria, UA None Seen None Seen /HPF   Lactate, Sepsis    Collection Time: 07/07/22 10:31 AM   Result Value Ref Range    Lactic Acid, Sepsis 2.5 (H) 0.4 - 1.9 mmol/L   Troponin    Collection Time: 07/07/22 10:31 AM   Result Value Ref Range    Troponin 0.22 (H) <0.01 ng/mL   PSA Screening    Collection Time: 07/07/22 10:31 AM   Result Value Ref Range    PSA 48.88 (H) 0.00 - 4.00 ng/mL   Basic Metabolic Panel    Collection Time: 07/08/22  4:44 AM   Result Value Ref Range    Sodium 147 (H) 136 - 145 mmol/L    Potassium 3.8 3.5 - 5.1 mmol/L    Chloride 106 99 - 110 mmol/L    CO2 26 21 - 32 mmol/L    Anion Gap 15 3 - 16    Glucose 213 (H) 70 - 99 mg/dL    BUN 88 (HH) 7 - 20 mg/dL    CREATININE 2.9 (H) 0.8 - 1.3 mg/dL    GFR Non-African American 20 (A) >60    GFR  25 (A) >60    Calcium 9.5 8.3 - 10.6 mg/dL   Renal Function Panel    Collection Time: 07/08/22  4:44 AM   Result Value Ref Range    Sodium 150 (H) 136 - 145 mmol/L    Potassium 3.9 3.5 - 5.1 mmol/L    Chloride 109 99 - 110 mmol/L    CO2 27 21 - 32 mmol/L    Anion Gap 14 3 - 16    Glucose 204 (H) 70 - 99 mg/dL    BUN 89 (HH) 7 - 20 mg/dL    CREATININE 2.8 (H) 0.8 - 1.3 mg/dL    GFR Non-African American 21 (A) >60    GFR  26 (A) >60    Calcium 9.5 8.3 - 10.6 mg/dL    Phosphorus 3.6 2.5 - 4.9 mg/dL    Albumin 3.6 3.4 - 5.0 g/dL   Electrolytes urine random    Collection Time: 07/08/22 12:30 PM   Result Value Ref Range    Sodium, Ur 32 Not Established mmol/L    Potassium, Ur 37.5 Not Established mmol/L    Chloride <20 Not Established mmol/L   Creatinine, Random Urine    Collection Time: 07/08/22 12:30 PM   Result Value Ref Range    Creatinine, Ur 80.4 39.0 - 259.0 mg/dL   Urea nitrogen, urine    Collection Time: 07/08/22 12:30 PM   Result Value Ref Range    Urea Nitrogen, Ur 962.4 800.0 - 1666.0 mg/dL   Legionella antigen, urine    Collection Time: 07/08/22 12:30 PM    Specimen: Urine voided   Result Value Ref Range    L. pneumophila Serogp 1 Ur Ag       Presumptive Negative  No Legionella pneumophila serogroup 1 antigens detected. A negative result does not exclude infection with  Legionella pneumophila serogroup 1 nor does it rule out  other microbial-caused respiratory infections or  disease caused by other serogroups of  Legionella pneumophila.   Normal Range: Presumptive Negative     Renal Function Panel    Collection Time: 07/09/22  6:03 AM   Result Value Ref Range    Sodium 138 136 - 145 mmol/L    Potassium 3.9 3.5 - 5.1 mmol/L    Chloride 102 99 - 110 mmol/L    CO2 28 21 - 32 mmol/L    Anion Gap 8 3 - 16    Glucose 304 (H) 70 - 99 mg/dL    BUN 61 (H) 7 - 20 mg/dL    CREATININE 2.1 (H) 0.8 - 1.3 mg/dL    GFR Non-African American 29 (A) >60    GFR  36 (A) >60    Calcium 8.5 8.3 - 10.6 mg/dL    Phosphorus 1.9 (L) 2.5 - 4.9 mg/dL    Albumin 3.1 (L) 3.4 - 5.0 g/dL   POCT Glucose    Collection Time: 07/09/22 11:47 AM   Result Value Ref Range    POC Glucose 302 (H) 70 - 99 mg/dl    Performed on ACCU-CHEK    POCT Glucose    Collection Time: 07/09/22  4:40 PM   Result Value Ref Range    POC Glucose 174 (H) 70 - 99 mg/dl    Performed on ACCU-CHEK    POCT Glucose    Collection Time: 07/09/22  7:48 PM   Result Value Ref Range    POC Glucose 160 (H) 70 - 99 mg/dl    Performed on ACCU-CHEK    Hemoglobin A1c    Collection Time: 07/10/22  1:59 AM   Result Value Ref Range    Hemoglobin A1C 6.7 See comment %    eAG 145.6 mg/dL   POCT Glucose    Collection Time: 07/10/22  7:15 AM   Result Value Ref Range    POC Glucose 211 (H) 70 - 99 mg/dl    Performed on ACCU-CHEK    Renal Function Panel    Collection Time: 07/10/22  9:30 AM   Result Value Ref Range    Sodium 138 136 - 145 mmol/L    Potassium 3.7 3.5 - 5.1 mmol/L    Chloride 102 99 - 110 mmol/L    CO2 30 21 - 32 mmol/L Anion Gap 6 3 - 16    Glucose 163 (H) 70 - 99 mg/dL    BUN 46 (H) 7 - 20 mg/dL    CREATININE 1.7 (H) 0.8 - 1.3 mg/dL    GFR Non- 38 (A) >60    GFR  45 (A) >60    Calcium 8.5 8.3 - 10.6 mg/dL    Phosphorus 1.6 (L) 2.5 - 4.9 mg/dL    Albumin 2.9 (L) 3.4 - 5.0 g/dL       ASSESSMENT AND PLAN     Hospital Problems           Last Modified POA    * (Principal) 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

## 2022-07-10 NOTE — PLAN OF CARE
Problem: Skin/Tissue Integrity  Goal: Absence of new skin breakdown  Description: 1. Monitor for areas of redness and/or skin breakdown  2. Assess vascular access sites hourly  3. Every 4-6 hours minimum:  Change oxygen saturation probe site  4. Every 4-6 hours:  If on nasal continuous positive airway pressure, respiratory therapy assess nares and determine need for appliance change or resting period.   7/10/2022 0958 by Darya Cabrera RN  Outcome: Progressing  7/9/2022 2259 by Latisha Johnson RN  Outcome: Progressing     Problem: Pain  Goal: Verbalizes/displays adequate comfort level or baseline comfort level  7/10/2022 0958 by Darya Cabrera RN  Outcome: Progressing  7/9/2022 2259 by Latisha Johnson RN  Outcome: Progressing     Problem: Safety - Adult  Goal: Free from fall injury  7/10/2022 0958 by Darya Cabrera RN  Outcome: Progressing  7/9/2022 2259 by Latisha Johnson RN  Outcome: Progressing  Flowsheets (Taken 7/9/2022 2136)  Free From Fall Injury: Instruct family/caregiver on patient safety     Problem: ABCDS Injury Assessment  Goal: Absence of physical injury  7/10/2022 0958 by Darya Cabrera RN  Outcome: Progressing  7/9/2022 2259 by Latisha Johnson RN  Outcome: Progressing  Flowsheets (Taken 7/9/2022 2136)  Absence of Physical Injury: Implement safety measures based on patient assessment     Problem: Nutrition Deficit:  Goal: Optimize nutritional status  7/10/2022 0958 by Darya Cabrera RN  Outcome: Progressing  7/9/2022 2259 by Latisha Johnson RN  Outcome: Progressing     Problem: Chronic Conditions and Co-morbidities  Goal: Patient's chronic conditions and co-morbidity symptoms are monitored and maintained or improved  7/10/2022 0958 by Darya Cabrera RN  Outcome: Progressing  7/9/2022 2259 by Latisha Johnson RN  Outcome: Progressing  Flowsheets (Taken 7/9/2022 2136)  Care Plan - Patient's Chronic Conditions and Co-Morbidity Symptoms are Monitored and Maintained or Improved: Monitor and assess patient's chronic conditions and comorbid symptoms for stability, deterioration, or improvement     Problem: Confusion  Goal: Confusion, delirium, dementia, or psychosis is improved or at baseline  Description: INTERVENTIONS:  1. Assess for possible contributors to thought disturbance, including medications, impaired vision or hearing, underlying metabolic abnormalities, dehydration, psychiatric diagnoses, and notify attending LIP  2. [place] high risk fall precautions, as indicated  3. Provide frequent short contacts to provide reality reorientation, refocusing and direction  4. Decrease environmental stimuli, including noise as appropriate  5. Monitor and intervene to maintain adequate nutrition, hydration, elimination, sleep and activity  6. If unable to ensure safety without constant attention obtain sitter and review sitter guidelines with assigned personnel  7.  Initiate Psychosocial CNS and Spiritual Care consult, as indicated  7/10/2022 0954 by Nani Malave RN  Outcome: Progressing  7/9/2022 1095 by Matt Hermosillo RN  Outcome: Progressing

## 2022-07-10 NOTE — PROGRESS NOTES
Pt evening shift assessment complete. VSS and medication given as ordered, did not give stool softeners as pt has had multiple soft BMs today. Pt repositioned and incontinent care and laws care given. Pt remains confused at this time. Pt assessed for comfort needs. Pt does not express any additional needs at this time, family in to visit,  resting comfortably in bed.

## 2022-07-10 NOTE — PROGRESS NOTES
Shift assessment completed. Vitals obtained. Scheduled medications given. Patient currently confused, slightly agitated and hallucinating. Pt incontinent of stool, willow-care provided, bed pad changed, zinc paste applied. Minimal output in laws, still leaking outside catheter. JACQUELINE podus boots remain on feet. Patient unable to verbalize pain but does not appear to be in distress. Pt declines breakfast. Call light in reach. Fall precautions in place, camera in room.

## 2022-07-10 NOTE — PROGRESS NOTES
MD Aleksey Lyons MD Renold Riling, MD               Office: [phone]                      Fax: [phone]             [facility]     Progress note    PATIENT NAME: Ivon Powell  : 1926  MRN: [id number]  REASON FOR CONSULT: For evaluation and management of Acute Kidney Injury. (My recommendations will be communicated by way of shared medical record.)        Acute kidney injury-moderate to severe-slow improvement-.  - Urine output improving.  - BUN is 46 and a creatinine of 1.7  - On admission creatinine is 3.4.  - Etiology likely multifactorial.  - Most likely related to previous. --Cannot exclude ATN. - Urology following        May have underlying chronic kidney disease. Multiple electrolyte imbalance-improving with improved urine output. - Potassium has improved to- 7 and remained stable  - Hypernatremia-slow improvement-. Hypovolemia improving. Medications reviewed. All nephrotoxic medications have been discontinued. Hold Ace inhibitors till renal recovery is complete. .      Hypotension-improving.  -  Volume status stable. Continue IV fluids for 1 more day  -  High complexity.                           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, on chronic dialysis, unspecified acute renal failure type ([address] [N17.9, N18.9, Z99.2]      PAMELA (on CKD: 3A):   - BL Scr-  1.2-1.3 as off 2018  ---> 3.4 on admission  -: Etiology of PAMELA - presumed pre-renal   - other differentials: unlikely ATN or  GN / TI / TMA process  - UA : results reviewed: relatively bland = pre-renal       Associated problems:   - Volume status: hypo-volemic  : BP: no need for tight control   : Na: hypernatremia - mild dehydration     - Azotemia: pre-renal   - Electrolytes: K: WNL  - Acid-Base: WNL  - Anemia: likely of CKD - mild        Other major problems: Management per primary and other consulting teams.   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 - Nephrotoxic medications have been discontinued. - Dose adjusted and appropriate. - Dose meds for eGFR <15 mL/min/1.73m2 during PAMELA    - Avoid heavy opioids due to renal failure - may use very low dose dilaudid / fentanyl with close monitoring of CNS and respiratory depression. Please refer to the orders. High Complexity. Multiple complex problems. Discussed with patient 's treatment team-   Time spent > 30 (~35) minutes. Thank you for allowing me to participate in this patient's care. Please do not hesitate to contact me anytime. We will follow along with you. Joaquina Noriega MD,  [facility] [id number] [place] [address] [place]: [phone] or [address]  Fax: [phone]        =======================================================================================   =======================================================================================      CHIEF COMPLAINT:   Chief Complaint   Patient presents with    Other     Pt srrived via [facility] from [address] home w c/o abnormal labs.      History Obtained From:  reason patient could not give history:  altered mental status + treatment team + Electronic Medical Records    HPI: Mr. Sarah Loera is a 80 y.o. male with significant past medical history as below:   Past Medical History:   Diagnosis Date    Age-related physical debility     Anxiety disorder due to known physiological condition     Anxiety disorder, unspecified     Benign prostatic hyperplasia without lower urinary tract symptoms     Bradycardia, unspecified     Cardiomyopathy ([facility]     Diabetes mellitus ([facility]     Dizziness and giddiness     Dysphagia, oropharyngeal phase     Hyperlipidemia     Hypertension     Major depressive disorder, recurrent, moderate (HCC)     Mild cognitive impairment, so stated     Muscle weakness (generalized)     Unspecified dementia without behavioral disturbance (HCC)     Unspecified systolic (congestive) heart failure (HCC)     Unsteadiness on feet       Presents with Other (Pt srrived via [facility] from [address] [facility] home w c/o abnormal labs.)    Admitted with Dehydration [E86.0]  Elevated troponin [R77.8]  PAMELA (acute kidney injury) ([facility] [N17.9]  Constipation, unspecified constipation type [K59.00]  Acute renal failure superimposed on chronic kidney disease, on chronic dialysis, unspecified acute renal failure type ([facility] [N17.9, N18.9, Z99.2]   Found to have elevated Cr, hypernatremia  , so we are called for that. *  Regarding: PAMELA on CKD  · Duration: acute on chronic  · Location: kidneys  · Severity: Severe   · Timing: continous  · Context (ex: related to condition):  , refer to my assessment / impression. · Modifying factors (ex: medications, interventions):  , refer to my plan / recommendation. · Associated signs & symptoms (ex: edema, SOB): As mentioned above in CC and HPI      Past medical, Surgical, Social, Family medical history reviewed by me.      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     Anxiety disorder, unspecified     Benign prostatic hyperplasia without lower urinary tract symptoms     Bradycardia, unspecified     Cardiomyopathy ([facility]     Diabetes mellitus ([facility]     Dizziness and giddiness     Dysphagia, oropharyngeal phase     Hyperlipidemia     Hypertension     Major depressive disorder, recurrent, moderate (HCC)     Mild cognitive impairment, so stated     Muscle weakness (generalized)     Unspecified dementia without behavioral disturbance (HCC)     Unspecified systolic (congestive) heart failure (HCC)     Unsteadiness on feet      PAST SURGICAL HISTORY: History reviewed. No pertinent surgical history. FAMILY HISTORY: History reviewed. No pertinent family history. SOCIAL HISTORY:   Social History     Socioeconomic History    Marital status:      Spouse name: None    Number of children: None    Years of education: None    Highest education level: None   Occupational History    None   Tobacco Use    Smoking status: Never Smoker    Smokeless tobacco: Never Used   Vaping Use    Vaping Use: Never used   Substance and Sexual Activity    Alcohol use: Not Currently    Drug use: Not Currently    Sexual activity: Not Currently   Other Topics Concern    None   Social History Narrative    None     Social Determinants of Health     Financial Resource Strain:     Difficulty of Paying Living Expenses: Not on file   Food Insecurity:     Worried About Running Out of Food in the Last Year: Not on file    Enriqueta of Food in the Last Year: Not on file   Transportation Needs:     Lack of Transportation (Medical): Not on file    Lack of Transportation (Non-Medical):  Not on file   Physical Activity:     Days of Exercise per Week: Not on file    Minutes of Exercise per Session: Not on file   Stress:     Feeling of Stress : Not on file   Social Connections:     Frequency of Communication with Friends and Family: Not on file    Frequency of Social Gatherings with Friends and Family: Not on file    Attends [employer]ism Services: Not on file    Active Member of Clubs or Organizations: Not on file    Attends Club or Organization Meetings: Not on file    Marital Status: Not on file   Intimate Partner Violence:     Fear of Current or Ex-Partner: Not on file    Emotionally Abused: Not on file    Physically Abused: Not on file    Sexually Abused: Not on file   Housing Stability:     Unable to Pay for Housing in the Last Year: Not on file    Number of [place] in the Last Year: Not on file    Unstable Housing in the Last Year: Not on file          MEDICATIONS: reviewed by me. Medications Prior to Admission:  No current facility-administered medications on file prior to encounter. Current Outpatient Medications on File Prior to Encounter   Medication Sig Dispense Refill    acetaminophen (TYLENOL) 500 MG tablet Take 500 mg by mouth nightly Indications: Nerve Disease      mineral oil-hydrophilic petrolatum (AQUAPHOR) ointment Apply topically nightly Apply to feet and legs topically at bedtime      aspirin 81 MG chewable tablet Take 81 mg by mouth daily      Cyanocobalamin (B-12) 1000 MCG CAPS Take by mouth daily      vitamin D (CHOLECALCIFEROL) 25 MCG (1000 UT) TABS tablet Take 1,000 Units by mouth daily      docusate sodium (COLACE) 100 MG capsule Take 200 mg by mouth nightly Indications: Constipation      donepezil (ARICEPT) 10 MG tablet Take 10 mg by mouth nightly Indications: 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       metoprolol succinate (TOPROL XL) 25 MG extended release tablet Take 12.5 mg by mouth daily Hold for SBP <110 or [id number], New Bag at 07/10/22 0903    heparin (porcine) injection 5,000 Units, 5,000 Units, SubCUTAneous, 3 times per day, Mallorie Laguna MD, 5,000 Units at 07/09/22 2137    acetaminophen (TYLENOL) tablet 650 mg, 650 mg, Oral, Q6H PRN, Mallorie Laguna MD, 650 mg at 07/09/22 0944    acetaminophen (TYLENOL) tablet 500 mg, 500 mg, Oral, Nightly, Mallorie Laguna MD, 500 mg at 07/09/22 2136    aspirin chewable tablet 81 mg, 81 mg, Oral, Daily, Mallorie Laguna MD, 81 mg at 07/10/22 4349    vitamin B-12 (CYANOCOBALAMIN) tablet 1,000 mcg, 1,000 mcg, Oral, Daily, Mallorie Laguna MD, 1,000 mcg at 07/10/22 0906    docusate sodium (COLACE) capsule 200 mg, 200 mg, Oral, Nightly, Mallorie Laguna MD, 200 mg at 07/08/22 2214    donepezil (ARICEPT) tablet 10 mg, 10 mg, Oral, Nightly, Mallorie Laguna MD, 10 mg at 07/09/22 2137    cetirizine (ZYRTEC) tablet 5 mg, 5 mg, Oral, Daily, Mallorie Laguna MD, 5 mg at 07/10/22 7615    finasteride (PROSCAR) tablet 5 mg, 5 mg, Oral, Daily, Mallorie Laguna MD, 5 mg at 07/10/22 0907    lidocaine (XYLOCAINE) 5 % ointment, , Topical, Q8H PRN, Mallorie Laguna MD, Given at 07/10/22 0011    metoprolol succinate (TOPROL XL) extended release tablet 12.5 mg, 12.5 mg, Oral, Daily, Mallorie Laguna MD, 12.5 mg at 07/09/22 6849    dermaphor ointment, , Topical, Nightly, Mallorie Laguna MD    therapeutic multivitamin-minerals 1 tablet, 1 tablet, Oral, Daily, Mallorie Laguna MD, 1 tablet at 07/10/22 0906    polyvinyl alcohol (LIQUIFILM TEARS) 1.4 % ophthalmic solution 1 drop, 1 drop, Both Eyes, 4x Daily, Mallorie Laguna MD, 1 drop at 07/10/22 0037    senna (SENOKOT) tablet 8.6 mg, 1 tablet, Oral, Nightly, Mallorie Laguna MD, 8.6 mg at 07/08/22 2216    sertraline (ZOLOFT) tablet 100 mg, 100 mg, Oral, Daily, Mallorie Lgauna MD, 100 mg at 07/10/22 0907    alogliptin (NESINA) tablet 6.25 mg, 6.25 mg, Oral, Daily, Mallorie Laguna MD, 6.25 mg at 07/10/22 0907    tamsulosin (FLOMAX) capsule 0.4 mg, 0.4 mg, Oral, Daily, Alton Perea MD, 0.4 mg at 07/10/22 0907    vitamin D (CHOLECALCIFEROL) tablet 1,000 Units, 1,000 Units, Oral, Daily, Alton Perea MD, 1,000 Units at 07/10/22 0906      Allergies reviewed by me: Amitriptyline, Gabapentin, Mercurochrome [merbromin], Mercury, and Merthiolate glycerite [thimerosal]    REVIEW OF SYSTEMS:Review of systems not obtained due to patient factors - mental status          =======================================================================================     PHYSICAL EXAM:  Recent vital signs and recent I/Os reviewed by me. Wt Readings from Last 3 Encounters:   07/10/22 144 lb 13.5 oz (65.7 kg)     BP Readings from Last 3 Encounters:   07/10/22 (!) 92/52   04/01/22 128/80     Patient Vitals for the past 24 hrs:   BP Temp Temp src Pulse Resp SpO2 Weight   07/10/22 0905 (!) 92/52 98.1 °F (36.7 °C) Oral 66 16 100 % --   07/10/22 0506 -- -- -- -- -- -- 144 lb 13.5 oz (65.7 kg)   07/10/22 0447 (!) 99/52 97.9 °F (36.6 °C) Axillary 92 14 100 % --   07/09/22 2342 110/65 -- -- -- -- -- --   07/09/22 2208 (!) 95/54 -- -- -- -- -- --   07/09/22 2133 (!) 90/55 98.1 °F (36.7 °C) Oral 71 14 96 % --   07/09/22 1645 104/61 -- -- -- -- -- --   07/09/22 1606 (!) 92/52 98.5 °F (36.9 °C) Oral 78 16 100 % --       Intake/Output Summary (Last 24 hours) at 7/10/2022 1242  Last data filed at 7/10/2022 0934  Gross per 24 hour   Intake 5041.16 ml   Output 425 ml   Net 4616.16 ml         Physical Exam  Vitals reviewed. Constitutional:       General: He is not in acute distress. Appearance: He is ill-appearing. HENT:      Head: Normocephalic and atraumatic. Right Ear: External ear normal.      Left Ear: External ear normal.      Nose: Nose normal.      Mouth/Throat:      Mouth: Mucous membranes are dry. Eyes:      General: No scleral icterus. Conjunctiva/sclera: Conjunctivae normal.   Neck:      Vascular: No JVD.    Cardiovascular:      Rate and Rhythm: Normal rate and regular rhythm. Heart sounds: S1 normal and S2 normal.   Pulmonary:      Effort: Pulmonary effort is normal. No respiratory distress. Breath sounds: Rhonchi present. Abdominal:      General: Bowel sounds are normal. There is no distension. Musculoskeletal:         General: No swelling or deformity. Cervical back: Normal range of motion and neck supple. Skin:     General: Skin is dry. Coloration: Skin is not jaundiced. Neurological:      Mental Status: He is disoriented.                =======================================================================================     DATA:  Diagnostic tests reviewed by me for today's visit:   (AS NEEDED FOR MY EVALUATION AND MANAGEMENT). No results for input(s): WBC, HEMOGLOBIN, HCT, PLT in the last 72 hours. Iron Saturation:  No components found for: PERCENTFE  FERRITIN:  No results found for: FERRITIN  IRON:  No results found for: IRON  TIBC:  No results found for: TIBC    Recent Labs     07/08/22 0444 07/09/22  0603 07/10/22  0930   *  147* 138 138   K 3.9  3.8 3.9 3.7     106 102 102   CO2 27  26 28 30   BUN 89*  88* 61* 46*   CREATININE 2.8*  2.9* 2.1* 1.7*     Recent Labs     07/08/22 0444 07/09/22  0603 07/10/22  0930   CALCIUM 9.5  9.5 8.5 8.5   PHOS 3.6 1.9* 1.6*     No results for input(s): PH, PCO2, PO2 in the last 72 hours.     Invalid input(s): [employer]    ABG:  No results found for: PH, PCO2, PO2, HCO3, BE, THGB, TCO2, O2SAT  VBG:    Lab Results   Component Value Date/Time    PHVEN 7.428 07/07/2022 08:05 AM    VIG0RUB 41.4 07/07/2022 08:05 AM    BEVEN 2.7 07/07/2022 08:05 AM    H5WLNXHM 99 07/07/2022 08:05 AM       LDH:  No results found for: LDH  Uric Acid:  No results found for: LABURIC, URICACID    PT/INR:  No results found for: PROTIME, INR  Warfarin PT/INR:  No components found for: PTPATWAR, PTINRWAR  PTT:  No results found for: APTT, PTT[APTT}  Last 3 Troponin: Lab Results   Component Value Date/Time    TROPONINI 0.22 07/07/2022 10:31 AM    TROPONINI 0.30 07/07/2022 08:05 AM       U/A:    Lab Results   Component Value Date/Time    COLORU Yellow 07/07/2022 08:05 AM    PROTEINU TRACE 07/07/2022 08:05 AM    PHUR 5.0 07/07/2022 08:05 AM    WBCUA 0-2 07/07/2022 08:05 AM    RBCUA None seen 07/07/2022 08:05 AM    BACTERIA None Seen 07/07/2022 08:05 AM    CLARITYU Clear 07/07/2022 08:05 AM    SPECGRAV 1.015 07/07/2022 08:05 AM    LEUKOCYTESUR TRACE 07/07/2022 08:05 AM    UROBILINOGEN 0.2 07/07/2022 08:05 AM    BILIRUBINUR Negative 07/07/2022 08:05 AM    BLOODU Negative 07/07/2022 08:05 AM    GLUCOSEU Negative 07/07/2022 08:05 AM     Microalbumen/Creatinine ratio:  No components found for: RUCREAT  24 Hour Urine for Protein:  No components found for: RAWUPRO, UHRS3, HCOT28ZJ, UTV3  24 Hour Urine for Creatinine Clearance:  No components found for: CREAT4, UHRS10, UTV10  Urine Toxicology:  No components found for: IAMMENTA, IBARBIT, IBENZO, ICOCAINE, IMARTHC, IOPIATES, IPHENCYC    HgBA1c:    Lab Results   Component Value Date/Time    LABA1C 6.7 07/10/2022 01:59 AM     RPR:  No results found for: RPR  HIV:  No results found for: HIV  SIMA:  No results found for: ANATITER, SIMA  RF:  No results found for: RF  DSDNA:  No components found for: DNA  AMYLASE:  No results found for: AMYLASE  LIPASE:  No results found for: LIPASE  Fibrinogen Level:  No components found for: FIB       BELOW MENTIONED RADIOLOGY STUDY RESULTS BY ME (AS NEEDED FOR MY EVALUATION AND MANAGEMENT). CT ABDOMEN PELVIS WO CONTRAST Additional Contrast? None    Result Date: 7/7/2022  EXAMINATION: CT OF THE ABDOMEN AND PELVIS WITHOUT CONTRAST 7/7/2022 8:03 am TECHNIQUE: CT of the abdomen and pelvis was performed without the administration of intravenous contrast. Multiplanar reformatted images are provided for review.  Automated exposure control, iterative reconstruction, and/or weight based adjustment of the mA/kV was utilized to reduce the radiation dose to as low as reasonably achievable. COMPARISON: None. HISTORY: ORDERING SYSTEM PROVIDED HISTORY: arabella, no abd pain TECHNOLOGIST PROVIDED HISTORY: Reason for exam:->arabella, no abd pain Additional Contrast?->None Decision Support Exception - unselect if not a suspected or confirmed emergency medical condition->Emergency Medical Condition (MA) Reason for Exam: arabella, no abd pain FINDINGS: Lower Chest: Trace left pleural effusion. Mild bibasilar atelectasis or fibrosis. Organs: Lack of IV and oral contrast limits sensitivity for visceral organ pathology. Within the kidneys bilaterally, no jc calculi. No hydronephrosis or perinephric stranding. No ureteral calculus. 1.4 cm fluid density lesion at the hepatic dome, Hounsfield units 13, likely cyst.  Liver demonstrates a nodular surface. Layering density within the gallbladder, compatible with sludge. Common bile duct is approximately 9 mm in caliber, likely related to patient age. Spleen is within normal limits. Stomach is decompressed. No pancreatic ductal dilatation or stranding. Thickening of the adrenal glands, maintaining adrenal shape. Calcification of the abdominal aorta and iliac arteries. Shotty retroperitoneal lymph nodes. GI/Bowel: Large stool ball is seen at the rectum measuring approximately 8.6 x 7.2 cm. Diverticulosis. Appendix is within normal limits in caliber. Small bowel is nondilated. Pelvis: Bladder is decompressed, with Westbrook catheter. No inguinal adenopathy. Peritoneum/Retroperitoneum: No free air. Bones/Soft Tissues: Metallic fragment is demonstrated at the right parasymphyseal pubic bone. Heterotopic ossification adjacent to the proximal right femur. Degenerative change throughout the spine. No evidence of obstructive uropathy. Large stool ball at the rectum; correlate for fecal impaction. Nodular contour of the liver surface; correlate for cirrhosis. Gallbladder sludge.      CT HEAD WO CONTRAST    Result Date: 7/7/2022  EXAMINATION: CT OF THE HEAD WITHOUT CONTRAST  7/7/2022 8:03 am TECHNIQUE: CT of the head was performed without the administration of intravenous contrast. Automated exposure control, iterative reconstruction, and/or weight based adjustment of the mA/kV was utilized to reduce the radiation dose to as low as reasonably achievable. COMPARISON: 04/01/2022 HISTORY: ORDERING SYSTEM PROVIDED HISTORY: weakness TECHNOLOGIST PROVIDED HISTORY: Has a \"code stroke\" or \"stroke alert\" been called? ->No Reason for exam:->weakness Decision Support Exception - unselect if not a suspected or confirmed emergency medical condition->Emergency Medical Condition (MA) Reason for Exam: weakness FINDINGS: BRAIN/VENTRICLES: Generalized cerebral and cerebellar atrophy. Prominence of the ventricular system is similar to prior. Moderate periventricular white matter hypodensity is seen bilaterally. Bilateral basal ganglia calcifications. No mass effect or midline shift. No gross acute hemorrhage. ORBITS: Postoperative changes of the globes. SINUSES: Mucosal thickening of the ethmoid air cells though as a whole, sinuses are aerated. SOFT TISSUES/SKULL:  No acute abnormality of the visualized skull or soft tissues. Atrophy and moderate small vessel ischemic disease. RECOMMENDATIONS: Unavailable     XR CHEST PORTABLE    Result Date: 7/7/2022  EXAMINATION: ONE XRAY VIEW OF THE CHEST 7/7/2022 8:00 am COMPARISON: 04/01/2022 HISTORY: ORDERING SYSTEM PROVIDED HISTORY: decreased oral intake, weakness, tachycardia TECHNOLOGIST PROVIDED HISTORY: Reason for exam:->decreased oral intake, weakness, tachycardia FINDINGS: Cardiac leads project over the chest.  Treadmill is again seen projecting at the upper left chest.  No confluent airspace disease. No pleural effusion or pneumothorax. Cardiac and mediastinal silhouettes are unchanged. Calcification of aorta. No acute airspace disease.

## 2022-07-11 LAB
ALBUMIN SERPL-MCNC: 2.4 G/DL (ref 3.4–5)
ANION GAP SERPL CALCULATED.3IONS-SCNC: 5 MMOL/L (ref 3–16)
BUN BLDV-MCNC: 40 MG/DL (ref 7–20)
CALCIUM SERPL-MCNC: 8.1 MG/DL (ref 8.3–10.6)
CHLORIDE BLD-SCNC: 106 MMOL/L (ref 99–110)
CO2: 26 MMOL/L (ref 21–32)
CREAT SERPL-MCNC: 1.5 MG/DL (ref 0.8–1.3)
GFR AFRICAN AMERICAN: 53
GFR NON-AFRICAN AMERICAN: 43
GLUCOSE BLD-MCNC: 101 MG/DL (ref 70–99)
GLUCOSE BLD-MCNC: 121 MG/DL (ref 70–99)
GLUCOSE BLD-MCNC: 127 MG/DL (ref 70–99)
GLUCOSE BLD-MCNC: 87 MG/DL (ref 70–99)
GLUCOSE BLD-MCNC: 94 MG/DL (ref 70–99)
PERFORMED ON: ABNORMAL
PERFORMED ON: NORMAL
PHOSPHORUS: 2.2 MG/DL (ref 2.5–4.9)
POTASSIUM SERPL-SCNC: 4 MMOL/L (ref 3.5–5.1)
SODIUM BLD-SCNC: 137 MMOL/L (ref 136–145)

## 2022-07-11 PROCEDURE — 92526 ORAL FUNCTION THERAPY: CPT

## 2022-07-11 PROCEDURE — 1200000000 HC SEMI PRIVATE

## 2022-07-11 PROCEDURE — 80069 RENAL FUNCTION PANEL: CPT

## 2022-07-11 PROCEDURE — 2580000003 HC RX 258: Performed by: INTERNAL MEDICINE

## 2022-07-11 PROCEDURE — 6370000000 HC RX 637 (ALT 250 FOR IP): Performed by: INTERNAL MEDICINE

## 2022-07-11 PROCEDURE — 36415 COLL VENOUS BLD VENIPUNCTURE: CPT

## 2022-07-11 PROCEDURE — 6360000002 HC RX W HCPCS: Performed by: INTERNAL MEDICINE

## 2022-07-11 RX ORDER — LORAZEPAM 2 MG/ML
1 CONCENTRATE ORAL EVERY 8 HOURS PRN
Status: DISCONTINUED | OUTPATIENT
Start: 2022-07-11 | End: 2022-07-12 | Stop reason: HOSPADM

## 2022-07-11 RX ORDER — DEXTROSE AND SODIUM CHLORIDE 5; .45 G/100ML; G/100ML
INJECTION, SOLUTION INTRAVENOUS CONTINUOUS
Status: DISCONTINUED | OUTPATIENT
Start: 2022-07-11 | End: 2022-07-12 | Stop reason: HOSPADM

## 2022-07-11 RX ORDER — MORPHINE SULFATE 20 MG/ML
5 SOLUTION ORAL
Status: DISCONTINUED | OUTPATIENT
Start: 2022-07-11 | End: 2022-07-12 | Stop reason: HOSPADM

## 2022-07-11 RX ADMIN — ALOGLIPTIN 6.25 MG: 6.25 TABLET, FILM COATED ORAL at 10:49

## 2022-07-11 RX ADMIN — CYANOCOBALAMIN TAB 1000 MCG 1000 MCG: 1000 TAB at 10:50

## 2022-07-11 RX ADMIN — DEXTROSE AND SODIUM CHLORIDE: 5; 450 INJECTION, SOLUTION INTRAVENOUS at 05:43

## 2022-07-11 RX ADMIN — HEPARIN SODIUM 5000 UNITS: 5000 INJECTION INTRAVENOUS; SUBCUTANEOUS at 14:36

## 2022-07-11 RX ADMIN — ACETAMINOPHEN 500 MG: 500 TABLET ORAL at 22:34

## 2022-07-11 RX ADMIN — SERTRALINE 100 MG: 50 TABLET, FILM COATED ORAL at 10:50

## 2022-07-11 RX ADMIN — POLYVINYL ALCOHOL 1 DROP: 14 SOLUTION/ DROPS OPHTHALMIC at 17:46

## 2022-07-11 RX ADMIN — INSULIN GLARGINE 10 UNITS: 100 INJECTION, SOLUTION SUBCUTANEOUS at 22:40

## 2022-07-11 RX ADMIN — CETIRIZINE HYDROCHLORIDE 5 MG: 10 TABLET, FILM COATED ORAL at 10:49

## 2022-07-11 RX ADMIN — FINASTERIDE 5 MG: 5 TABLET, FILM COATED ORAL at 10:51

## 2022-07-11 RX ADMIN — ASPIRIN 81 MG: 81 TABLET, CHEWABLE ORAL at 10:52

## 2022-07-11 RX ADMIN — HEPARIN SODIUM 5000 UNITS: 5000 INJECTION INTRAVENOUS; SUBCUTANEOUS at 22:39

## 2022-07-11 RX ADMIN — HEPARIN SODIUM 5000 UNITS: 5000 INJECTION INTRAVENOUS; SUBCUTANEOUS at 05:42

## 2022-07-11 RX ADMIN — LIDOCAINE: 50 OINTMENT TOPICAL at 22:44

## 2022-07-11 RX ADMIN — DEXTROSE AND SODIUM CHLORIDE: 5; 450 INJECTION, SOLUTION INTRAVENOUS at 18:02

## 2022-07-11 RX ADMIN — TAMSULOSIN HYDROCHLORIDE 0.4 MG: 0.4 CAPSULE ORAL at 10:51

## 2022-07-11 RX ADMIN — POLYVINYL ALCOHOL 1 DROP: 14 SOLUTION/ DROPS OPHTHALMIC at 22:15

## 2022-07-11 RX ADMIN — DOCUSATE SODIUM 200 MG: 100 CAPSULE, LIQUID FILLED ORAL at 22:34

## 2022-07-11 RX ADMIN — SENNOSIDES 8.6 MG: 8.6 TABLET, FILM COATED ORAL at 22:34

## 2022-07-11 RX ADMIN — Medication 1000 UNITS: at 10:49

## 2022-07-11 RX ADMIN — POLYVINYL ALCOHOL 1 DROP: 14 SOLUTION/ DROPS OPHTHALMIC at 10:51

## 2022-07-11 RX ADMIN — DONEPEZIL HYDROCHLORIDE 10 MG: 5 TABLET, FILM COATED ORAL at 22:34

## 2022-07-11 RX ADMIN — Medication 1 TABLET: at 10:49

## 2022-07-11 RX ADMIN — TRAMADOL HYDROCHLORIDE 50 MG: 50 TABLET, COATED ORAL at 10:50

## 2022-07-11 ASSESSMENT — PAIN SCALES - WONG BAKER
WONGBAKER_NUMERICALRESPONSE: 0

## 2022-07-11 ASSESSMENT — PAIN SCALES - GENERAL
PAINLEVEL_OUTOF10: 9
PAINLEVEL_OUTOF10: 0

## 2022-07-11 NOTE — CONSULTS
PALLIATIVE MEDICINE CONSULTATION     Patient name:Cedrick Gonzalez   DIW:[id number]    :1926  Room/Bed:N-5563/5563-01   LOS: 4 days         Date of consult:2022    Consult Information  Palliative Medicine Consult performed by: GA Webber CNP, CNP    Inpatient consult to Palliative Care  Consult performed by: GA Webber CNP  Consult ordered by: Seb Goldman MD  Reason for consult: [address]           ASSESSMENT/RECOMMENDATIONS     80 y.o. male with AMS and poor appetite      Symptom Management:  1. AMS- pt oriented to self with nonsensical speech  2. Poor appetite- weight loss on chart review 30lb weight loss in 1 year  3. Goals of Care- pt does not have capacity to make his own decision called spouse to discuss Hospice care option as recommended by Dr Vance Girard she stated that she was busy and would need to contact her back after 6pm. I will send referral to [facility] who is one of the 2 contracted Hospice agencies with [facility] and have them follow-up with spouse to discuss Hospice as an option. [facility] on admission    Patient/Family Goals of Care :    pt does not have capacity to make his own decision called spouse to discuss Hospice care option as recommended by Dr Vance Girard she stated that she was busy and would need to contact her back after 6pm. I will send referral to [facility] who is one of the 2 contracted Hospice agencies with [facility] and have them follow-up with spouse to discuss Hospice as an option. [facility] on admission    Disposition/Discharge Plan:   pending    Advance Directives:  · Surrogate Decision Maker: Amanda-spouse  · Code status:  DNR-CC    Case discussed with: patient, floor RN  Thank you for allowing us to participate in the care of this patient. HISTORY     CC: AMS  HPI: The patient is a 80 y.o. male with past medical history significant for dementia, type 2 diabetes, hypertension, CHF.  Admission with multiple medical issues including poor status is at baseline. He is disoriented.                 Current labs in the epic chart reviewed as of 7/11/2022   Review of previous notes, admits, labs, radiology and testing relevant to this consult done in this chart today 7/11/2022      Total time: 40 minutes  >50% of time spent counseling patient at bedside or POA/family member if applicable , reviewing information and discussing care, coordinating with care team  Signed By: Electronically signed by GA Pablo CNP on 7/11/2022 at 2:18 PM  Palliative Medicine   [phone]    July 11, 2022

## 2022-07-11 NOTE — PLAN OF CARE
Problem: Skin/Tissue Integrity  Goal: Absence of new skin breakdown  Description: 1. Monitor for areas of redness and/or skin breakdown  2. Assess vascular access sites hourly  3. Every 4-6 hours minimum:  Change oxygen saturation probe site  4. Every 4-6 hours:  If on nasal continuous positive airway pressure, respiratory therapy assess nares and determine need for appliance change or resting period.   7/10/2022 2342 by Alfredo De Anda RN  Outcome: Progressing  7/10/2022 0958 by Maria Guadalupe Altman RN  Outcome: Progressing     Problem: Pain  Goal: Verbalizes/displays adequate comfort level or baseline comfort level  7/10/2022 2342 by Alfredo De Anda RN  Outcome: Progressing  Flowsheets (Taken 7/10/2022 2000)  Verbalizes/displays adequate comfort level or baseline comfort level:   Encourage patient to monitor pain and request assistance   Assess pain using appropriate pain scale   Administer analgesics based on type and severity of pain and evaluate response   Implement non-pharmacological measures as appropriate and evaluate response   Consider cultural and social influences on pain and pain management   Notify Licensed Independent Practitioner if interventions unsuccessful or patient reports new pain  7/10/2022 0958 by Maria Guadalupe Altman RN  Outcome: Progressing     Problem: Safety - Adult  Goal: Free from fall injury  7/10/2022 2342 by Alfredo De Anda RN  Outcome: Progressing  7/10/2022 0958 by Maria Guadalupe Altman RN  Outcome: Progressing     Problem: ABCDS Injury Assessment  Goal: Absence of physical injury  7/10/2022 2342 by Alfredo De Anda RN  Outcome: Progressing  7/10/2022 0958 by Maria Guadalupe Altman RN  Outcome: Progressing     Problem: Nutrition Deficit:  Goal: Optimize nutritional status  7/10/2022 2342 by Alfredo De Anda RN  Outcome: Progressing  7/10/2022 0958 by Maria Guadalupe Altman RN  Outcome: Progressing     Problem: Chronic Conditions and Co-morbidities  Goal: Patient's chronic conditions and co-morbidity symptoms are monitored and maintained or improved  7/10/2022 2342 by Kevin Garcia RN  Outcome: Progressing  Flowsheets (Taken 7/10/2022 2000)  Care Plan - Patient's Chronic Conditions and Co-Morbidity Symptoms are Monitored and Maintained or Improved:   Monitor and assess patient's chronic conditions and comorbid symptoms for stability, deterioration, or improvement   Collaborate with multidisciplinary team to address chronic and comorbid conditions and prevent exacerbation or deterioration   Update acute care plan with appropriate goals if chronic or comorbid symptoms are exacerbated and prevent overall improvement and discharge  7/10/2022 0958 by Roberto Carlos Blackman RN  Outcome: Progressing     Problem: Confusion  Goal: Confusion, delirium, dementia, or psychosis is improved or at baseline  Description: INTERVENTIONS:  1. Assess for possible contributors to thought disturbance, including medications, impaired vision or hearing, underlying metabolic abnormalities, dehydration, psychiatric diagnoses, and notify attending LIP  2. [place] high risk fall precautions, as indicated  3. Provide frequent short contacts to provide reality reorientation, refocusing and direction  4. Decrease environmental stimuli, including noise as appropriate  5. Monitor and intervene to maintain adequate nutrition, hydration, elimination, sleep and activity  6. If unable to ensure safety without constant attention obtain sitter and review sitter guidelines with assigned personnel  7.  Initiate Psychosocial CNS and Spiritual Care consult, as indicated  7/10/2022 2342 by Kevin Garcia RN  Outcome: Progressing  Flowsheets (Taken 7/10/2022 2000)  Effect of thought disturbance (confusion, delirium, dementia, or psychosis) are managed with adequate functional status:   Assess for contributors to thought disturbance, including medications, impaired vision or hearing, underlying metabolic abnormalities, dehydration, psychiatric diagnoses, notify [place] high risk fall precautions, as indicated   Provide frequent short contacts to provide reality reorientation, refocusing and direction   Decrease environmental stimuli, including noise as appropriate   Monitor and intervene to maintain adequate nutrition, hydration, elimination, sleep and activity   If unable to ensure safety without constant attention obtain sitter and review sitter guidelines with assigned personnel   Initiate Psychosocial Clinical Nurse Specialist and [facility] consult, as indicated  7/10/2022 0958 by Louise Rosenthal RN  Outcome: Progressing     Problem: Chronic Conditions and Co-morbidities  Goal: Patient's chronic conditions and co-morbidity symptoms are monitored and maintained or improved  7/10/2022 2342 by Charan Villagomez RN  Outcome: Progressing  Flowsheets (Taken 7/10/2022 2000)  Care Plan - Patient's Chronic Conditions and Co-Morbidity Symptoms are Monitored and Maintained or Improved:   Monitor and assess patient's chronic conditions and comorbid symptoms for stability, deterioration, or improvement   Collaborate with multidisciplinary team to address chronic and comorbid conditions and prevent exacerbation or deterioration   Update acute care plan with appropriate goals if chronic or comorbid symptoms are exacerbated and prevent overall improvement and discharge  7/10/2022 0958 by Louise Rosenthal RN  Outcome: Progressing

## 2022-07-11 NOTE — FLOWSHEET NOTE
Shift assessment completed as charted. Patient alert to self, is total care and turn q 2hrs and prn,  Is feeder,  Unable to feed self,  Poor intake,  Stage II coccxy with dressing, laws catheter,  Had to irrigate laws once due to sediment, urine dark kris,  ouput adequate,  Receiving IVF's tolerating at this time Vitals stable. Afebrile. Call light within reach. Bed in low position.

## 2022-07-11 NOTE — PLAN OF CARE
Problem: Skin/Tissue Integrity  Goal: Absence of new skin breakdown  Description: 1. Monitor for areas of redness and/or skin breakdown  2. Assess vascular access sites hourly  3. Every 4-6 hours minimum:  Change oxygen saturation probe site  4. Every 4-6 hours:  If on nasal continuous positive airway pressure, respiratory therapy assess nares and determine need for appliance change or resting period.   7/11/2022 1159 by Zenia Ferreira RN  Outcome: Progressing     Problem: Pain  Goal: Verbalizes/displays adequate comfort level or baseline comfort level  7/11/2022 1159 by Zenia Ferreira RN  Outcome: Progressing     Problem: Safety - Adult  Goal: Free from fall injury  7/11/2022 1159 by Zenia Ferreira RN  Outcome: Progressing     Problem: ABCDS Injury Assessment  Goal: Absence of physical injury  7/11/2022 1159 by Zenia Ferreira RN  Outcome: Progressing

## 2022-07-11 NOTE — PROGRESS NOTES
MD Vitaly Garcia MD Linder Bitters, MD               Office: [phone]                      Fax: [phone]             [facility]     Renal Progress note    PATIENT NAME: Chano Lopez  : 1926  MRN: [id number]    Subjective  Not able to verbalize well  Now [facility]    Recent Hospital Course  Acute kidney injury-moderate to severe-slow improvement-.    - On admission creatinine is 3.4. Now better at 1.5. Low bp. - Etiology likely multifactorial.  Has decreased renal perfusion  -  with lower bp, continue IVF today    May have underlying chronic kidney disease. Hypernatremia-improved    Medications reviewed. With crea improving and now [facility] will sign off. Call if needed. Thank you. Assessment and Plan  Admitted for:  Dehydration [E86.0]  Elevated troponin [R77.8]  PAMELA (acute kidney injury) (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 Better.      - Azotemia: pre-renal   - Electrolytes: K: WNL  - Acid-Base: WNL  - Anemia: likely of CKD - mild        Other major problems: Management per primary and other consulting teams.   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, on chronic dialysis ([address] 2022 Yes    Failure to thrive (child) 2022 Yes    Severe protein-calorie malnutrition ([address] 7/8/2022 Yes    Vascular dementia ([address] 7/8/2022 Yes    Elevated troponin 7/8/2022 Yes    Elevated PSA 7/8/2022 Yes    Hypotension 7/8/2022 Yes            Osorio Francis MD,  [facility] [address] [place]: [phone] or Via [employer]  Fax: [phone]        =======================================================================================   =======================================================================================      CHIEF COMPLAINT:   Chief Complaint   Patient presents with    Other     [facility] [address] [facility] [address] w c/o abnormal labs.      History Obtained From:  reason patient could not give history:  altered mental status + treatment team + Electronic Medical Records    HPI: Mr. Corwin Bliss is a 80 y.o. male with significant past medical history as below:   Past Medical History:   Diagnosis Date    Age-related physical debility     Anxiety disorder due to known physiological condition     Anxiety disorder, unspecified     Benign prostatic hyperplasia without lower urinary tract symptoms     Bradycardia, unspecified     Cardiomyopathy ([facility]     Diabetes mellitus ([facility] [address]     Dizziness and giddiness     Dysphagia, oropharyngeal phase     Hyperlipidemia     Hypertension     Major depressive disorder, recurrent, moderate (HCC)     Mild cognitive impairment, so stated     Muscle weakness (generalized)     Unspecified dementia without behavioral disturbance (HCC)     Unspecified systolic (congestive) heart failure (HCC)     Unsteadiness on feet       Presents with Other (Pt srrived via [facility] from [address] w c/o abnormal labs.)    Admitted with Dehydration [E86.0]  Elevated troponin [R77.8]  PAMELA (acute kidney injury) ([address] [N17.9]  Constipation, unspecified constipation type [K59.00]  Acute renal failure superimposed on chronic kidney disease, on chronic dialysis, unspecified acute renal failure type ([address] [N17.9, N18.9, Z99.2]   Found to have elevated Cr, hypernatremia  , so we are called for that. *  Regarding: PAMELA on CKD  · Duration: acute on chronic  · Location: kidneys  · Severity: Severe   · Timing: continous  · Context (ex: related to condition):  , refer to my assessment / impression. · Modifying factors (ex: medications, interventions):  , refer to my plan / recommendation. · Associated signs & symptoms (ex: edema, SOB): As mentioned above in CC and HPI      Past medical, Surgical, Social, Family medical history reviewed by me. 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     Anxiety disorder, unspecified     Benign prostatic hyperplasia without lower urinary tract symptoms     Bradycardia, unspecified     Cardiomyopathy ([address]     Diabetes mellitus ([address]     Dizziness and giddiness     Dysphagia, oropharyngeal phase     Hyperlipidemia     Hypertension     Major depressive disorder, recurrent, moderate (HCC)     Mild cognitive impairment, so stated     Muscle weakness (generalized)     Unspecified dementia without behavioral disturbance (HCC)     Unspecified systolic (congestive) heart failure (HCC)     Unsteadiness on feet      PAST SURGICAL HISTORY: History reviewed. No pertinent surgical history. FAMILY HISTORY: History reviewed. No pertinent family history.   SOCIAL HISTORY:   Social History     Socioeconomic History    Marital status:      Spouse name: None    Number of children: None    Years of education: None    Highest education level: None   Occupational History    None   Tobacco Use    Smoking status: Never Smoker    Smokeless tobacco: Never Used   Vaping Use    Vaping Use: Never used   Substance and Sexual Activity    Alcohol use: Not Currently    Drug use: Not Currently    Sexual activity: Not Currently   Other Topics Concern    None   Social History Narrative    None     Social Determinants of Health     Financial Resource Strain:     Difficulty of Paying Living Expenses: Not on file   Food Insecurity:     Worried About Running Out of Food in the Last Year: Not on file    Enriqueta of Food in the Last Year: Not on file   Transportation Needs:     Lack of Transportation (Medical): Not on file    Lack of Transportation (Non-Medical): Not on file   Physical Activity:     Days of Exercise per Week: Not on file    Minutes of Exercise per Session: Not on file   Stress:     Feeling of Stress : Not on file   Social Connections:     Frequency of Communication with Friends and Family: Not on file    Frequency of Social Gatherings with Friends and Family: Not on file    Attends Spiritism Services: Not on file    Active Member of [address] [place] [address] [email or web address] or Organizations: Not on file    Attends Club or Organization Meetings: Not on file    Marital Status: Not on file   Intimate Partner Violence:     Fear of Current or Ex-Partner: Not on file    Emotionally Abused: Not on file    Physically Abused: Not on file    Sexually Abused: Not on file   Housing Stability:     Unable to Pay for Housing in the Last Year: Not on file    Number of [place] in the Last Year: Not on file    Unstable Housing in the Last Year: Not on file          MEDICATIONS: reviewed by me. Medications Prior to Admission:  No current facility-administered medications on file prior to encounter.      Current Outpatient Medications on File Prior to Encounter   Medication Sig Dispense Refill    acetaminophen (TYLENOL) 500 MG tablet Take 500 mg by mouth nightly Indications: Nerve Disease      mineral oil-hydrophilic petrolatum (AQUAPHOR) ointment Apply topically nightly Apply to feet and legs topically at bedtime      aspirin 81 MG chewable tablet Take 81 mg by mouth daily      Cyanocobalamin (B-12) 1000 MCG CAPS Take by mouth daily      vitamin D (CHOLECALCIFEROL) 25 MCG (1000 UT) TABS tablet Take 1,000 Units by mouth daily      docusate sodium (COLACE) 100 MG capsule Take 200 mg by mouth nightly Indications: Constipation      donepezil (ARICEPT) 10 MG tablet Take 10 mg by mouth nightly Indications: 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       metoprolol succinate (TOPROL XL) 25 MG extended release tablet Take 12.5 mg by mouth daily Hold for SBP <110 or HR <60      Multiple Vitamins-Minerals (THERAPEUTIC MULTIVITAMIN-MINERALS) tablet Take 1 tablet by mouth daily      calcium-vitamin D (OSCAL-500) 500-200 MG-UNIT per tablet Take 1 tablet by mouth 2 times daily      senna (SENOKOT) 8.6 MG tablet Take 1 tablet by mouth daily as needed for Constipation      senna (SENOKOT) 8.6 MG tablet Take 2 tablets by mouth daily as needed for Constipation      SITagliptin (JANUVIA) 25 MG tablet Take 25 mg by mouth daily      polyethyl glycol-propyl glycol 0.4-0.3 % (SYSTANE) 0.4-0.3 % ophthalmic solution 1 drop 4 times daily Instill one drop in both eyes daily      acetaminophen (TYLENOL) 325 MG tablet Take 650 mg by mouth every 6 hours as needed for Pain or Fever      sertraline (ZOLOFT) 100 MG tablet Take 100 mg by mouth daily      sertraline (ZOLOFT) 25 MG tablet Take 25 mg by mouth daily           Current Facility-Administered Medications:     melatonin tablet 3 mg, 3 mg, Oral, Nightly PRN, GA Plata - CNP, 3 mg at 07/10/22 2140    traMADol (ULTRAM) tablet 50 mg, 50 mg, Oral, Q12H PRN, Esperanza Murray MD, 50 mg at 07/11/22 1050    dextrose bolus 10% 125 mL, 125 mL, IntraVENous, PRN **OR** dextrose bolus 10% 250 mL, 250 mL, IntraVENous, PRN, Shira Ramirez MD    glucagon (rDNA) injection 1 mg, 1 mg, IntraMUSCular, PRN, Shira Ramirez MD    dextrose 5 % solution, 100 mL/hr, IntraVENous, PRN, Shira Ramirez MD    insulin glargine (LANTUS) injection vial 10 Units, 0.15 Units/kg, SubCUTAneous, Nightly, Shira Ramirez MD, 10 Units at 07/10/22 2140    insulin lispro (HUMALOG) injection vial 0-12 Units, 0-12 Units, SubCUTAneous, TID WC, Shira Ramirez MD, 4 Units at 07/10/22 0907    insulin lispro (HUMALOG) injection vial 0-6 Units, 0-6 Units, SubCUTAneous, Nightly, Shira Ramirez MD, 1 Units at 07/09/22 2139    heparin (porcine) injection 5,000 Units, 5,000 Units, SubCUTAneous, 3 times per day, Shira Ramirez MD, 5,000 Units at 07/11/22 1436    acetaminophen (TYLENOL) tablet 650 mg, 650 mg, Oral, Q6H PRN, Shira Ramirez MD, 650 mg at 07/09/22 0944    acetaminophen (TYLENOL) tablet 500 mg, 500 mg, Oral, Nightly, Shira Ramirez MD, 500 mg at 07/10/22 2140    aspirin chewable tablet 81 mg, 81 mg, Oral, Daily, Shira Ramirez MD, 81 mg at 07/11/22 1052    vitamin B-12 (CYANOCOBALAMIN) tablet 1,000 mcg, 1,000 mcg, Oral, Daily, Shira Ramirez MD, 1,000 mcg at 07/11/22 1050    docusate sodium (COLACE) capsule 200 mg, 200 mg, Oral, Nightly, Shira Ramirez MD, 200 mg at 07/10/22 2140    donepezil (ARICEPT) tablet 10 mg, 10 mg, Oral, Nightly, Shira Ramirez MD, 10 mg at 07/10/22 2140    cetirizine (ZYRTEC) tablet 5 mg, 5 mg, Oral, Daily, Shira Ramirez MD, 5 mg at 07/11/22 1049    finasteride (PROSCAR) tablet 5 mg, 5 mg, Oral, Daily, Shira Ramirez MD, 5 mg at 07/11/22 1051    lidocaine (XYLOCAINE) 5 % ointment, , Topical, Q8H PRN, Shira Ramirez MD, Given at 07/10/22 0011    metoprolol succinate (TOPROL XL) extended release tablet 12.5 mg, 12.5 mg, Oral, Daily, Shira Ramirez MD, 12.5 mg at 07/09/22 0944    dermaphor ointment, , Topical, Nightly, Naseem Graham MD    therapeutic multivitamin-minerals 1 tablet, 1 tablet, Oral, Daily, Naseem Graham MD, 1 tablet at 07/11/22 1049    polyvinyl alcohol (LIQUIFILM TEARS) 1.4 % ophthalmic solution 1 drop, 1 drop, Both Eyes, 4x Daily, Naseem Graham MD, 1 drop at 07/11/22 1051    senna (SENOKOT) tablet 8.6 mg, 1 tablet, Oral, Nightly, Naseem Graham MD, 8.6 mg at 07/10/22 2140    sertraline (ZOLOFT) tablet 100 mg, 100 mg, Oral, Daily, Naseem Graham MD, 100 mg at 07/11/22 1050    alogliptin (NESINA) tablet 6.25 mg, 6.25 mg, Oral, Daily, Naseem Graham MD, 6.25 mg at 07/11/22 1049    tamsulosin (FLOMAX) capsule 0.4 mg, 0.4 mg, Oral, Daily, Naseem Graham MD, 0.4 mg at 07/11/22 1051    vitamin D (CHOLECALCIFEROL) tablet 1,000 Units, 1,000 Units, Oral, Daily, Naseem Graham MD, 1,000 Units at 07/11/22 1049      Allergies reviewed by me: Amitriptyline, Gabapentin, Mercurochrome [merbromin], Mercury, and Merthiolate glycerite [thimerosal]    REVIEW OF SYSTEMS:Review of systems not obtained due to patient factors - mental status          =======================================================================================     PHYSICAL EXAM:  Recent vital signs and recent I/Os reviewed by me.      Wt Readings from Last 3 Encounters:   07/10/22 144 lb 13.5 oz (65.7 kg)     BP Readings from Last 3 Encounters:   07/11/22 (!) 87/50   04/01/22 128/80     Patient Vitals for the past 24 hrs:   BP Temp Temp src Pulse Resp SpO2   07/11/22 1716 (!) 87/50 98.7 °F (37.1 °C) Oral 66 16 98 %   07/11/22 1220 (!) 79/50 98.1 °F (36.7 °C) Oral 67 -- 97 %   07/11/22 0819 (!) 77/48 98.1 °F (36.7 °C) Axillary 64 16 98 %   07/11/22 0030 -- 98.5 °F (36.9 °C) Oral 62 -- --   07/10/22 2343 (!) 101/56 98 °F (36.7 °C) Oral 67 16 --   07/10/22 2000 (!) 93/54 98 °F (36.7 °C) Oral 67 -- 100 %       Intake/Output Summary (Last 24 hours) at 7/11/2022 1732  Last data filed at 7/10/2022 2000  Gross per 24 hour   Intake 130 ml   Output --   Net 130 ml         Physical Exam  Vitals reviewed. Constitutional:       General: He is not in acute distress. Appearance: He is ill-appearing. HENT:      Head: Normocephalic and atraumatic. Right Ear: External ear normal.      Left Ear: External ear normal.      Nose: Nose normal.      Mouth/Throat:      Mouth: Mucous membranes are dry. Eyes:      General: No scleral icterus. Conjunctiva/sclera: Conjunctivae normal.   Neck:      Vascular: No JVD. Cardiovascular:      Rate and Rhythm: Normal rate and regular rhythm. Heart sounds: S1 normal and S2 normal.   Pulmonary:      Effort: Pulmonary effort is normal. No respiratory distress. Breath sounds: Rhonchi present. Abdominal:      General: Bowel sounds are normal. There is no distension. Musculoskeletal:         General: No swelling or deformity. Cervical back: Normal range of motion and neck supple. Skin:     General: Skin is dry. Coloration: Skin is not jaundiced. Neurological:      Mental Status: He is disoriented.                =======================================================================================     DATA:  Diagnostic tests reviewed by me for today's visit:   (AS NEEDED FOR MY EVALUATION AND MANAGEMENT). No results for input(s): WBC, HEMOGLOBIN, HCT, PLT in the last 72 hours. Iron Saturation:  No components found for: PERCENTFE  FERRITIN:  No results found for: FERRITIN  IRON:  No results found for: IRON  TIBC:  No results found for: TIBC    Recent Labs     07/09/22  0603 07/10/22  0930 07/11/22  0626    138 137   K 3.9 3.7 4.0    102 106   CO2 28 30 26   BUN 61* 46* 40*   CREATININE 2.1* 1.7* 1.5*     Recent Labs     07/09/22  0603 07/10/22  0930 07/11/22  0626   CALCIUM 8.5 8.5 8.1*   PHOS 1.9* 1.6* 2.2*     No results for input(s): PH, PCO2, PO2 in the last 72 hours.     Invalid input(s): [id number], INSPIREDO2    ABG:  No results found for: PH, PCO2, PO2, HCO3, BE, THGB, TCO2, O2SAT  VBG:    Lab Results   Component Value Date/Time    PHVEN 7.428 07/07/2022 08:05 AM    OOV5RJS 41.4 07/07/2022 08:05 AM    BEVEN 2.7 07/07/2022 08:05 AM    C7ALAGDE 99 07/07/2022 08:05 AM       LDH:  No results found for: LDH  Uric Acid:  No results found for: LABURIC, URICACID    PT/INR:  No results found for: PROTIME, INR  Warfarin PT/INR:  No components found for: PTPATWAR, PTINRWAR  PTT:  No results found for: APTT, PTT[APTT}  Last 3 Troponin:    Lab Results   Component Value Date/Time    TROPONINI 0.22 07/07/2022 10:31 AM    TROPONINI 0.30 07/07/2022 08:05 AM       U/A:    Lab Results   Component Value Date/Time    COLORU Yellow 07/07/2022 08:05 AM    PROTEINU TRACE 07/07/2022 08:05 AM    PHUR 5.0 07/07/2022 08:05 AM    WBCUA 0-2 07/07/2022 08:05 AM    RBCUA None seen 07/07/2022 08:05 AM    BACTERIA None Seen 07/07/2022 08:05 AM    CLARITYU Clear 07/07/2022 08:05 AM    SPECGRAV 1.015 07/07/2022 08:05 AM    LEUKOCYTESUR TRACE 07/07/2022 08:05 AM    UROBILINOGEN 0.2 07/07/2022 08:05 AM    BILIRUBINUR Negative 07/07/2022 08:05 AM    BLOODU Negative 07/07/2022 08:05 AM    GLUCOSEU Negative 07/07/2022 08:05 AM     Microalbumen/Creatinine ratio:  No components found for: RUCREAT  24 Hour Urine for Protein:  No components found for: RAWUPRO, UHRS3, [id number], UTV3  24 Hour Urine for Creatinine Clearance:  No components found for: CREAT4, UHRS10, UTV10  Urine Toxicology:  No components found for: IAMMENTA, IBARBIT, IBENZO, ICOCAINE, IMARTHC, IOPIATES, IPHENCYC    HgBA1c:    Lab Results   Component Value Date/Time    LABA1C 6.7 07/10/2022 01:59 AM     RPR:  No results found for: RPR  HIV:  No results found for: HIV  SIMA:  No results found for: ANATITER, SIMA  RF:  No results found for: RF  DSDNA:  No components found for: DNA  AMYLASE:  No results found for: AMYLASE  LIPASE:  No results found for: LIPASE  Fibrinogen Level:  No components found for: FIB       BELOW MENTIONED RADIOLOGY STUDY RESULTS BY ME (AS NEEDED FOR MY EVALUATION AND MANAGEMENT). CT ABDOMEN PELVIS WO CONTRAST Additional Contrast? None    Result Date: 7/7/2022  EXAMINATION: CT OF THE ABDOMEN AND PELVIS WITHOUT CONTRAST 7/7/2022 8:03 am TECHNIQUE: CT of the abdomen and pelvis was performed without the administration of intravenous contrast. Multiplanar reformatted images are provided for review. Automated exposure control, iterative reconstruction, and/or weight based adjustment of the mA/kV was utilized to reduce the radiation dose to as low as reasonably achievable. COMPARISON: None. HISTORY: ORDERING SYSTEM PROVIDED HISTORY: arabella, no abd pain TECHNOLOGIST PROVIDED HISTORY: Reason for exam:->arabella, no abd pain Additional Contrast?->None Decision Support Exception - unselect if not a suspected or confirmed emergency medical condition->Emergency Medical Condition (MA) Reason for Exam: arabella, no abd pain FINDINGS: Lower Chest: Trace left pleural effusion. Mild bibasilar atelectasis or fibrosis. Organs: Lack of IV and oral contrast limits sensitivity for visceral organ pathology. Within the kidneys bilaterally, no jc calculi. No hydronephrosis or perinephric stranding. No ureteral calculus. 1.4 cm fluid density lesion at the hepatic dome, Hounsfield units 13, likely cyst.  Liver demonstrates a nodular surface. Layering density within the gallbladder, compatible with sludge. Common bile duct is approximately 9 mm in caliber, likely related to patient age. Spleen is within normal limits. Stomach is decompressed. No pancreatic ductal dilatation or stranding. Thickening of the adrenal glands, maintaining adrenal shape. Calcification of the abdominal aorta and iliac arteries. Shotty retroperitoneal lymph nodes. GI/Bowel: Large stool ball is seen at the rectum measuring approximately 8.6 x 7.2 cm. Diverticulosis. Appendix is within normal limits in caliber. Small bowel is nondilated.  Pelvis: Bladder is decompressed, with Westbrook catheter. No inguinal adenopathy. Peritoneum/Retroperitoneum: No free air. Bones/Soft Tissues: Metallic fragment is demonstrated at the right parasymphyseal pubic bone. Heterotopic ossification adjacent to the proximal right femur. Degenerative change throughout the spine. No evidence of obstructive uropathy. Large stool ball at the rectum; correlate for fecal impaction. Nodular contour of the liver surface; correlate for cirrhosis. Gallbladder sludge. CT HEAD WO CONTRAST    Result Date: 7/7/2022  EXAMINATION: CT OF THE HEAD WITHOUT CONTRAST  7/7/2022 8:03 am TECHNIQUE: CT of the head was performed without the administration of intravenous contrast. Automated exposure control, iterative reconstruction, and/or weight based adjustment of the mA/kV was utilized to reduce the radiation dose to as low as reasonably achievable. COMPARISON: 04/01/2022 HISTORY: ORDERING SYSTEM PROVIDED HISTORY: weakness TECHNOLOGIST PROVIDED HISTORY: Has a \"code stroke\" or \"stroke alert\" been called? ->No Reason for exam:->weakness Decision Support Exception - unselect if not a suspected or confirmed emergency medical condition->Emergency Medical Condition (MA) Reason for Exam: weakness FINDINGS: BRAIN/VENTRICLES: Generalized cerebral and cerebellar atrophy. Prominence of the ventricular system is similar to prior. Moderate periventricular white matter hypodensity is seen bilaterally. Bilateral basal ganglia calcifications. No mass effect or midline shift. No gross acute hemorrhage. ORBITS: Postoperative changes of the globes. SINUSES: Mucosal thickening of the ethmoid air cells though as a whole, sinuses are aerated. SOFT TISSUES/SKULL:  No acute abnormality of the visualized skull or soft tissues. Atrophy and moderate small vessel ischemic disease.  RECOMMENDATIONS: Unavailable     XR CHEST PORTABLE    Result Date: 7/7/2022  EXAMINATION: ONE XRAY VIEW OF THE CHEST 7/7/2022 8:00 am COMPARISON: 04/01/2022 HISTORY: ORDERING SYSTEM PROVIDED HISTORY: decreased oral intake, weakness, tachycardia TECHNOLOGIST PROVIDED HISTORY: Reason for exam:->decreased oral intake, weakness, tachycardia FINDINGS: Cardiac leads project over the chest.  Treadmill is again seen projecting at the upper left chest.  No confluent airspace disease. No pleural effusion or pneumothorax. Cardiac and mediastinal silhouettes are unchanged. Calcification of aorta. No acute airspace disease.

## 2022-07-11 NOTE — PROGRESS NOTES
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, on chronic dialysis, unspecified acute renal failure type ([facility] [address] [N17.9, N18.9, Z99.2]  Treatment Diagnosis: Oropharyngeal Dysphagia   Pain: Did not state                                              Diet and Treatment Recommendations 2022:  Diet Solids Recommendation:  Dysphagia I Puree  Liquid Consistency Recommendation: Thin liquids  Recommended form of Meds: Meds in puree  or Crushed as able in puree         Compensatory strategies:   Check for pocketing of food L, Check for pocketing of food R, Upright as possible with all PO intake , Assist Feed , Eat/feed slowly    Assessment of Texture Tolerance:  Diet level prior to treatment: Dysphagia I Puree , Thin liquids   Tolerance of Current Diet Level:RN reported pt appears to be tolerating current diet level  Poor oral intake     Impressions: Pt was positioned Upright in bed , awake and alert. Currently on room air. Upon further assessment, oral cavity was moist with decreased evidence of mucous. Puree and soft solids presented as trials this date however pt only accepting of thin liquids. Pt required assistance with holding cup and benefited from use of straw to assist with oral control. Successive drinks of thin liquids via straw revealed suspected premature bolus and a mildly delayed swallow initiation. No overt clinical immediate or delayed s/s of aspiration noted with minimal trials. Pt demonstrates increased risk for aspiration due to difficulty with self-feeding, cognitive state , co morbidities  and deconditioning .  Based on today's assessment recommend Dysphagia I Puree  with Thin liquids , Meds in puree  or Crushed as able in puree . Dysphagia Goals:    Pt will functionally tolerate recommended diet with no overt clinical s/s of aspiration (Ongoing 07/11/22)  Pt will demonstrate understanding of aspiration risk and precautions via education/demonstration with occasional prompting (Ongoing 07/11/22)  Pt will advance to least restrictive diet as indicated (Ongoing 07/11/22)    Plan:   3-5 times per week during acute care stay. Patient/Family Education:  Provided education regarding role of SLP, recommendations and general speech pathology plan of care. [] Pt verbalized understanding and agreement   [] Pt requires ongoing learning   [x] No evidence of comprehension     Discharge Recommendations:    Discharge recommendations to be determined pending ongoing follow-up during acute care stay    Timed Code Treatment: 0 minutes    Total Treatment Time: 10 minutes    If patient discharges prior to next session this note will serve as a discharge summary.      Signature:   Page Pimentel., [id number] [facility] [id number]  Speech-Language Pathologist  7/11/2022 2:52 PM

## 2022-07-11 NOTE — PROGRESS NOTES
Department of Internal Medicine  General Internal Medicine   Progress Note      SUBJECTIVE: somnolent , weak phonation , poor appetite     History obtained from chart review and the patient  General ROS: positive for  - fatigue, malaise and weight loss  negative for - chills, fever or night sweats  Psychological ROS: positive for - anxiety, disorientation, irritability and memory difficulties  negative for - hallucinations or hostility  Ophthalmic ROS: negative  Respiratory ROS: no cough, shortness of breath, or wheezing  Cardiovascular ROS: no chest pain or dyspnea on exertion  Gastrointestinal ROS: no abdominal pain, change in bowel habits, or black or bloody stools  Genito-Urinary ROS: no dysuria, trouble voiding, or hematuria  Musculoskeletal ROS: chronic pain   Neurological ROS: no TIA or stroke symptoms  Dermatological ROS: negative    OBJECTIVE      Medications      Current Facility-Administered Medications: dextrose 5 % and 0.45 % sodium chloride infusion, , IntraVENous, Continuous  morphine 20MG/ML concentrated solution 5 mg, 5 mg, Oral, Q2H PRN  LORazepam (ATIVAN) 2 MG/ML concentrated solution 1 mg, 1 mg, Oral, Q8H PRN  melatonin tablet 3 mg, 3 mg, Oral, Nightly PRN  traMADol (ULTRAM) tablet 50 mg, 50 mg, Oral, Q12H PRN  dextrose bolus 10% 125 mL, 125 mL, IntraVENous, PRN **OR** dextrose bolus 10% 250 mL, 250 mL, IntraVENous, PRN  glucagon (rDNA) injection 1 mg, 1 mg, IntraMUSCular, PRN  dextrose 5 % solution, 100 mL/hr, IntraVENous, PRN  insulin glargine (LANTUS) injection vial 10 Units, 0.15 Units/kg, SubCUTAneous, Nightly  insulin lispro (HUMALOG) injection vial 0-12 Units, 0-12 Units, SubCUTAneous, TID WC  insulin lispro (HUMALOG) injection vial 0-6 Units, 0-6 Units, SubCUTAneous, Nightly  heparin (porcine) injection 5,000 Units, 5,000 Units, SubCUTAneous, 3 times per day  acetaminophen (TYLENOL) tablet 650 mg, 650 mg, Oral, Q6H PRN  acetaminophen (TYLENOL) tablet 500 mg, 500 mg, Oral, Nightly  aspirin chewable tablet 81 mg, 81 mg, Oral, Daily  vitamin B-12 (CYANOCOBALAMIN) tablet 1,000 mcg, 1,000 mcg, Oral, Daily  docusate sodium (COLACE) capsule 200 mg, 200 mg, Oral, Nightly  donepezil (ARICEPT) tablet 10 mg, 10 mg, Oral, Nightly  finasteride (PROSCAR) tablet 5 mg, 5 mg, Oral, Daily  lidocaine (XYLOCAINE) 5 % ointment, , Topical, Q8H PRN  dermaphor ointment, , Topical, Nightly  therapeutic multivitamin-minerals 1 tablet, 1 tablet, Oral, Daily  polyvinyl alcohol (LIQUIFILM TEARS) 1.4 % ophthalmic solution 1 drop, 1 drop, Both Eyes, 4x Daily  senna (SENOKOT) tablet 8.6 mg, 1 tablet, Oral, Nightly  sertraline (ZOLOFT) tablet 100 mg, 100 mg, Oral, Daily  alogliptin (NESINA) tablet 6.25 mg, 6.25 mg, Oral, Daily  tamsulosin (FLOMAX) capsule 0.4 mg, 0.4 mg, Oral, Daily  vitamin D (CHOLECALCIFEROL) tablet 1,000 Units, 1,000 Units, Oral, Daily    Physical      Vitals: BP (!) 87/50   Pulse 66   Temp 98.7 °F (37.1 °C) (Oral)   Resp 16   Ht 6' (1.829 m)   Wt 144 lb 13.5 oz (65.7 kg)   SpO2 98%   BMI 19.64 kg/m²   Temp: Temp: 98.7 °F (37.1 °C)  Max: Temp  Av.2 °F (36.8 °C)  Min: 98 °F (36.7 °C)  Max: 98.7 °F (37.1 °C)  Respiration range:  Resp  Av  Min: 16  Max: 16  Pulse Range:  Pulse  Av.5  Min: 62  Max: 67  Blood pressure range:  Systolic (10MYZ), JMR:56 , Min:77 , TFP:557   , Diastolic (36BVR), HQO:91, Min:48, Max:56    SpO2  Av.3 %  Min: 97 %  Max: 100 %    Intake/Output Summary (Last 24 hours) at 2022 905  Last data filed at 7/10/2022 2000  Gross per 24 hour   Intake 130 ml   Output --   Net 130 ml       Vent settings:  Pulse  Av.1  Min: 61  Max: 121  Resp  Av.3  Min: 12  Max: 31  SpO2  Av.6 %  Min: 95 %  Max: 100 %    CONSTITUTIONAL:  fatigued, alert, uncooperative, distracted, mild distress, appears stated age and thin  EYES:  Unremarkable   NECK:  No JVD  and supple, symmetrical, trachea midline  BACK:  symmetric  LUNGS:  no increased work of breathing, moderate air exchange, no retractions and no crackles or wheezing  CARDIOVASCULAR:  normal apical pulses, regular rate and rhythm, normal S1 and S2 and no S3  ABDOMEN:  Soft scaphoid BS +   MUSCULOSKELETAL:  No distal edema , has weak pulsations  NEUROLOGIC:  No acute deficit but has generalized extensive weakness Babinski absent   SKIN:  Warm dry and pale  and no bruising or bleeding    Data      Recent Results (from the past 96 hour(s))   Basic Metabolic Panel    Collection Time: 07/08/22  4:44 AM   Result Value Ref Range    Sodium 147 (H) 136 - 145 mmol/L    Potassium 3.8 3.5 - 5.1 mmol/L    Chloride 106 99 - 110 mmol/L    CO2 26 21 - 32 mmol/L    Anion Gap 15 3 - 16    Glucose 213 (H) 70 - 99 mg/dL    BUN 88 (HH) 7 - 20 mg/dL    CREATININE 2.9 (H) 0.8 - 1.3 mg/dL    GFR Non-African American 20 (A) >60    GFR  25 (A) >60    Calcium 9.5 8.3 - 10.6 mg/dL   Renal Function Panel    Collection Time: 07/08/22  4:44 AM   Result Value Ref Range    Sodium 150 (H) 136 - 145 mmol/L    Potassium 3.9 3.5 - 5.1 mmol/L    Chloride 109 99 - 110 mmol/L    CO2 27 21 - 32 mmol/L    Anion Gap 14 3 - 16    Glucose 204 (H) 70 - 99 mg/dL    BUN 89 (HH) 7 - 20 mg/dL    CREATININE 2.8 (H) 0.8 - 1.3 mg/dL    GFR Non-African American 21 (A) >60    GFR  26 (A) >60    Calcium 9.5 8.3 - 10.6 mg/dL    Phosphorus 3.6 2.5 - 4.9 mg/dL    Albumin 3.6 3.4 - 5.0 g/dL   Electrolytes urine random    Collection Time: 07/08/22 12:30 PM   Result Value Ref Range    Sodium, Ur 32 Not Established mmol/L    Potassium, Ur 37.5 Not Established mmol/L    Chloride <20 Not Established mmol/L   Creatinine, Random Urine    Collection Time: 07/08/22 12:30 PM   Result Value Ref Range    Creatinine, Ur 80.4 39.0 - 259.0 mg/dL   Urea nitrogen, urine    Collection Time: 07/08/22 12:30 PM   Result Value Ref Range    Urea Nitrogen, Ur 962.4 800.0 - 1666.0 mg/dL   Legionella antigen, urine    Collection Time: 07/08/22 12:30 PM    Specimen: Urine voided Result Value Ref Range    L. pneumophila Serogp 1 Ur Ag       Presumptive Negative  No Legionella pneumophila serogroup 1 antigens detected. A negative result does not exclude infection with  Legionella pneumophila serogroup 1 nor does it rule out  other microbial-caused respiratory infections or  disease caused by other serogroups of  Legionella pneumophila.   Normal Range: Presumptive Negative     Renal Function Panel    Collection Time: 07/09/22  6:03 AM   Result Value Ref Range    Sodium 138 136 - 145 mmol/L    Potassium 3.9 3.5 - 5.1 mmol/L    Chloride 102 99 - 110 mmol/L    CO2 28 21 - 32 mmol/L    Anion Gap 8 3 - 16    Glucose 304 (H) 70 - 99 mg/dL    BUN 61 (H) 7 - 20 mg/dL    CREATININE 2.1 (H) 0.8 - 1.3 mg/dL    GFR Non-African American 29 (A) >60    GFR  36 (A) >60    Calcium 8.5 8.3 - 10.6 mg/dL    Phosphorus 1.9 (L) 2.5 - 4.9 mg/dL    Albumin 3.1 (L) 3.4 - 5.0 g/dL   POCT Glucose    Collection Time: 07/09/22 11:47 AM   Result Value Ref Range    POC Glucose 302 (H) 70 - 99 mg/dl    Performed on ACCU-CHEK    POCT Glucose    Collection Time: 07/09/22  4:40 PM   Result Value Ref Range    POC Glucose 174 (H) 70 - 99 mg/dl    Performed on ACCU-CHEK    POCT Glucose    Collection Time: 07/09/22  7:48 PM   Result Value Ref Range    POC Glucose 160 (H) 70 - 99 mg/dl    Performed on ACCU-CHEK    Hemoglobin A1c    Collection Time: 07/10/22  1:59 AM   Result Value Ref Range    Hemoglobin A1C 6.7 See comment %    eAG 145.6 mg/dL   POCT Glucose    Collection Time: 07/10/22  7:15 AM   Result Value Ref Range    POC Glucose 211 (H) 70 - 99 mg/dl    Performed on ACCU-CHEK    Renal Function Panel    Collection Time: 07/10/22  9:30 AM   Result Value Ref Range    Sodium 138 136 - 145 mmol/L    Potassium 3.7 3.5 - 5.1 mmol/L    Chloride 102 99 - 110 mmol/L    CO2 30 21 - 32 mmol/L    Anion Gap 6 3 - 16    Glucose 163 (H) 70 - 99 mg/dL    BUN 46 (H) 7 - 20 mg/dL    CREATININE 1.7 (H) 0.8 - 1.3 mg/dL    GFR Non- 38 (A) >60    GFR  45 (A) >60    Calcium 8.5 8.3 - 10.6 mg/dL    Phosphorus 1.6 (L) 2.5 - 4.9 mg/dL    Albumin 2.9 (L) 3.4 - 5.0 g/dL   POCT Glucose    Collection Time: 07/10/22 12:02 PM   Result Value Ref Range    POC Glucose 136 (H) 70 - 99 mg/dl    Performed on ACCU-CHEK    POCT Glucose    Collection Time: 07/10/22  4:37 PM   Result Value Ref Range    POC Glucose 104 (H) 70 - 99 mg/dl    Performed on ACCU-CHEK    POCT Glucose    Collection Time: 07/10/22  8:09 PM   Result Value Ref Range    POC Glucose 122 (H) 70 - 99 mg/dl    Performed on ACCU-CHEK    Renal Function Panel    Collection Time: 07/11/22  6:26 AM   Result Value Ref Range    Sodium 137 136 - 145 mmol/L    Potassium 4.0 3.5 - 5.1 mmol/L    Chloride 106 99 - 110 mmol/L    CO2 26 21 - 32 mmol/L    Anion Gap 5 3 - 16    Glucose 94 70 - 99 mg/dL    BUN 40 (H) 7 - 20 mg/dL    CREATININE 1.5 (H) 0.8 - 1.3 mg/dL    GFR Non- 43 (A) >60    GFR  53 (A) >60    Calcium 8.1 (L) 8.3 - 10.6 mg/dL    Phosphorus 2.2 (L) 2.5 - 4.9 mg/dL    Albumin 2.4 (L) 3.4 - 5.0 g/dL   POCT Glucose    Collection Time: 07/11/22  7:42 AM   Result Value Ref Range    POC Glucose 101 (H) 70 - 99 mg/dl    Performed on ACCU-CHEK    POCT Glucose    Collection Time: 07/11/22 12:15 PM   Result Value Ref Range    POC Glucose 121 (H) 70 - 99 mg/dl    Performed on [address] [place] [address] Problems           Last Modified POA    * (Principal) 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

## 2022-07-11 NOTE — PROGRESS NOTES
Patient hypotensive this morning, BP 77/48. Patient asymptomatic. Hospitalist notified, awaiting response.

## 2022-07-12 VITALS
HEIGHT: 72 IN | OXYGEN SATURATION: 100 % | BODY MASS INDEX: 19.62 KG/M2 | HEART RATE: 73 BPM | SYSTOLIC BLOOD PRESSURE: 105 MMHG | TEMPERATURE: 97.6 F | DIASTOLIC BLOOD PRESSURE: 62 MMHG | WEIGHT: 144.84 LBS | RESPIRATION RATE: 15 BRPM

## 2022-07-12 LAB
GLUCOSE BLD-MCNC: 58 MG/DL (ref 70–99)
GLUCOSE BLD-MCNC: 65 MG/DL (ref 70–99)
GLUCOSE BLD-MCNC: 68 MG/DL (ref 70–99)
GLUCOSE BLD-MCNC: 72 MG/DL (ref 70–99)
PERFORMED ON: ABNORMAL
PERFORMED ON: NORMAL

## 2022-07-12 PROCEDURE — 2580000003 HC RX 258: Performed by: INTERNAL MEDICINE

## 2022-07-12 PROCEDURE — 92526 ORAL FUNCTION THERAPY: CPT

## 2022-07-12 PROCEDURE — 6370000000 HC RX 637 (ALT 250 FOR IP): Performed by: INTERNAL MEDICINE

## 2022-07-12 PROCEDURE — 6360000002 HC RX W HCPCS: Performed by: INTERNAL MEDICINE

## 2022-07-12 RX ORDER — INSULIN LISPRO 100 [IU]/ML
0-12 INJECTION, SOLUTION INTRAVENOUS; SUBCUTANEOUS
Qty: 1 EACH | Refills: 2 | Status: SHIPPED | OUTPATIENT
Start: 2022-07-12 | End: 2022-08-11

## 2022-07-12 RX ORDER — INSULIN GLARGINE 100 [IU]/ML
0.15 INJECTION, SOLUTION SUBCUTANEOUS NIGHTLY
Qty: 10 ML | Refills: 3 | Status: SHIPPED | OUTPATIENT
Start: 2022-07-12

## 2022-07-12 RX ORDER — LANOLIN ALCOHOL/MO/W.PET/CERES
3 CREAM (GRAM) TOPICAL NIGHTLY PRN
Refills: 3 | COMMUNITY
Start: 2022-07-12

## 2022-07-12 RX ORDER — LORAZEPAM 2 MG/ML
1 CONCENTRATE ORAL EVERY 8 HOURS PRN
Qty: 30 ML | Refills: 0 | Status: SHIPPED | OUTPATIENT
Start: 2022-07-12 | End: 2022-07-24

## 2022-07-12 RX ORDER — INSULIN LISPRO 100 [IU]/ML
0-12 INJECTION, SOLUTION INTRAVENOUS; SUBCUTANEOUS
Qty: 1 EACH | Refills: 0 | Status: SHIPPED | OUTPATIENT
Start: 2022-07-12 | End: 2022-07-12

## 2022-07-12 RX ORDER — MORPHINE SULFATE 20 MG/ML
5 SOLUTION ORAL
Qty: 30 ML | Refills: 0 | Status: SHIPPED | OUTPATIENT
Start: 2022-07-12 | End: 2022-07-22

## 2022-07-12 RX ORDER — INSULIN GLARGINE 100 [IU]/ML
0.15 INJECTION, SOLUTION SUBCUTANEOUS NIGHTLY
Qty: 10 ML | Refills: 3 | Status: SHIPPED | OUTPATIENT
Start: 2022-07-12 | End: 2022-07-12

## 2022-07-12 RX ORDER — MORPHINE SULFATE 20 MG/ML
5 SOLUTION ORAL
Qty: 30 ML | Refills: 0 | Status: SHIPPED | OUTPATIENT
Start: 2022-07-12 | End: 2022-07-12

## 2022-07-12 RX ORDER — LORAZEPAM 2 MG/ML
1 CONCENTRATE ORAL EVERY 8 HOURS PRN
Qty: 30 ML | Refills: 0 | Status: SHIPPED | OUTPATIENT
Start: 2022-07-12 | End: 2022-07-12

## 2022-07-12 RX ORDER — TRAMADOL HYDROCHLORIDE 50 MG/1
50 TABLET ORAL EVERY 12 HOURS PRN
Qty: 12 TABLET | Refills: 0 | Status: SHIPPED | OUTPATIENT
Start: 2022-07-12 | End: 2022-07-19

## 2022-07-12 RX ORDER — TRAMADOL HYDROCHLORIDE 50 MG/1
50 TABLET ORAL EVERY 12 HOURS PRN
Qty: 10 TABLET | Refills: 0 | Status: SHIPPED | OUTPATIENT
Start: 2022-07-12 | End: 2022-07-12

## 2022-07-12 RX ADMIN — ALOGLIPTIN 6.25 MG: 6.25 TABLET, FILM COATED ORAL at 09:45

## 2022-07-12 RX ADMIN — HEPARIN SODIUM 5000 UNITS: 5000 INJECTION INTRAVENOUS; SUBCUTANEOUS at 05:13

## 2022-07-12 RX ADMIN — Medication 1 TABLET: at 09:45

## 2022-07-12 RX ADMIN — DEXTROSE AND SODIUM CHLORIDE: 5; 450 INJECTION, SOLUTION INTRAVENOUS at 10:08

## 2022-07-12 RX ADMIN — FINASTERIDE 5 MG: 5 TABLET, FILM COATED ORAL at 09:45

## 2022-07-12 RX ADMIN — ASPIRIN 81 MG: 81 TABLET, CHEWABLE ORAL at 09:45

## 2022-07-12 RX ADMIN — SERTRALINE 100 MG: 50 TABLET, FILM COATED ORAL at 09:45

## 2022-07-12 RX ADMIN — POLYVINYL ALCOHOL 1 DROP: 14 SOLUTION/ DROPS OPHTHALMIC at 12:13

## 2022-07-12 RX ADMIN — CYANOCOBALAMIN TAB 1000 MCG 1000 MCG: 1000 TAB at 09:45

## 2022-07-12 RX ADMIN — Medication 1000 UNITS: at 09:44

## 2022-07-12 RX ADMIN — POLYVINYL ALCOHOL 1 DROP: 14 SOLUTION/ DROPS OPHTHALMIC at 09:45

## 2022-07-12 RX ADMIN — MORPHINE SULFATE 5 MG: 10 SOLUTION ORAL at 05:02

## 2022-07-12 RX ADMIN — TAMSULOSIN HYDROCHLORIDE 0.4 MG: 0.4 CAPSULE ORAL at 09:45

## 2022-07-12 ASSESSMENT — PAIN SCALES - WONG BAKER: WONGBAKER_NUMERICALRESPONSE: 0

## 2022-07-12 ASSESSMENT — PAIN SCALES - GENERAL
PAINLEVEL_OUTOF10: 0
PAINLEVEL_OUTOF10: 0

## 2022-07-12 NOTE — PROGRESS NOTES
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, on chronic dialysis, unspecified acute renal failure type ([facility] [address] [N17.9, N18.9, Z99.2]  Treatment Diagnosis: Oropharyngeal Dysphagia   Pain: Did not state                                              Diet and Treatment Recommendations 2022:  Diet Solids Recommendation:  Dysphagia I Puree  Liquid Consistency Recommendation: Thin liquids  Recommended form of Meds: Meds in puree  or Crushed as able in puree         Compensatory strategies:   Check for pocketing of food L, Check for pocketing of food R, Upright as possible with all PO intake , Assist Feed , Eat/feed slowly    Assessment of Texture Tolerance:  Diet level prior to treatment: Dysphagia I Puree , Thin liquids   Tolerance of Current Diet Level:Poor oral intake . Per chart review, pt's family has decided to enroll with Hospice care. Impressions: Pt was positioned Upright in bed , awake and alert. Currently on room air. Pt reported improving oral care but stated his mouth is still in bad shape. He had his lower partials in place but was attempting to remove them. Pt did not tolerate upright positioning in bed, reported increased pain secondary to bed sores. He was willing to attempt thin liquids (orange juice) but while laying supine. Attempted x1 but deferred any further presentations to minimize aspiration risk due to poor positioning. No overt clinical indications of aspiration with small sample size.  Recommend implementation of safe swallow strategies and feeding assistance to minimize aspiration risk related to difficulty with self-feeding,

## 2022-07-12 NOTE — PROGRESS NOTES
Physician Progress Note      PATIENT:               Mikaela Etienne  CSN #:                  [id number]  :                       1926  ADMIT DATE:       2022 7:21 AM  DISCH DATE:  RESPONDING  PROVIDER #:        Jojo Zarco MD          QUERY TEXT:    Patient admitted with PAMELA. Per HP, noted to also have decubitus ulceration on   buttock. If possible, please document in progress notes and discharge summary   the severity/depth of the ulcer: The medical record reflects the following:  Risk Factors: 79yo male with age related debility, PAMELA, and malnutrition    Clinical Indicators: HP, He also has a decubitus ulceration on buttock  ED RN note, Open wound on coccyx. Surrounded area non-blanchable. Mepliex in   place  RN note 7/10, Stage II coccyx with dressing    Treatment: Dressing changes, incontinence care, monitoring    thank you,  Jered Aguilar RN, [email or web address]  Options provided:  -- Skin breakdown only  -- Exposed fat layer  -- Muscle involvement without necrosis  -- Other - I will add my own diagnosis  -- Disagree - Not applicable / Not valid  -- Disagree - Clinically unable to determine / Unknown  -- Refer to Clinical Documentation Reviewer    PROVIDER RESPONSE TEXT:    This patient has a decubitus ulceration ulcer with skin breakdown only. Query created by:  Sara Jara on 2022 9:32 AM      Electronically signed by:  Jojo Zarco MD 2022 12:11 PM

## 2022-07-12 NOTE — PROGRESS NOTES
Received an update from [facility] [place].  Met with family will enroll pt in Hospice at noon today and plan transport back to facility at 6pm.

## 2022-07-12 NOTE — PROGRESS NOTES
Nutrition Note    RECOMMENDATIONS  1. PO Diet: Recommend to liberalize diet to dysphagia pureed with no other modifiers  2. ONS: Continue Ensure Enlive for pleasure   3. Nutrition Support: None      NUTRITION ASSESSMENT   Pt has continued with poor appetite and PO intake, consuming less than 25% of meals. Per palliative care note, plan is for pt to enroll in hospice today and transport back to facility at 6 pm tonight. Thus, RD will follow pt at low nutrition risk. Should plan of care change and aggressive nutrition care is desired, please submit a dietitian consult.  Nutrition Related Findings: Oriented to person. Non-pitting generalized edema. +1 pitting BLE edema.  Wounds: None   Nutrition Education:  Education not indicated    Nutrition Goals: PO intake 50% or greater     MALNUTRITION ASSESSMENT   Malnutrition Status: Insufficient data    NUTRITION DIAGNOSIS   · Inadequate protein-energy intake related to inadequate protein-energy intake as evidenced by intake 0-25%    CURRENT NUTRITION THERAPIES  ADULT DIET; Dysphagia - Pureed; 3 carb choices (45 gm/meal); Low Fat/Low Chol/High Fiber/VIJAY; Low Sodium (2 gm); Low Potassium (Less than 3000 mg/day); Low Phosphorus (Less than 1000 mg); Less than 60 gm  ADULT ORAL NUTRITION SUPPLEMENT; Dinner, Breakfast; Standard High Calorie/High Protein Oral Supplement     PO Intake: 0%,1-25%   PO Supplement Intake:Unable to assess    ANTHROPOMETRICS   Current Height: 6' (182.9 cm)   Current Weight: 144 lb 13.5 oz (65.7 kg)     Ideal Body Weight (IBW): 178 lbs  (81 kg)        BMI: 19.6    COMPARATIVE STANDARDS  Energy (kcal):  4332-3338     Protein (g):   grams       Fluid (mL/day):  4257-6646 mL    The patient will be monitored per nutrition standards of care. Consult dietitian if additional nutrition interventions are needed prior to RD reassessment.      [place], [address], [place]    Contact: [phone]

## 2022-07-12 NOTE — PROGRESS NOTES
Department of Internal Medicine  General Internal Medicine   Progress Note      SUBJECTIVE: none , unable to verbalize , looks on a progressive decline BP running low     History obtained from chart review and the patient  General ROS: positive for  - fatigue, malaise and weight loss  negative for - chills, fever or night sweats  Psychological ROS: positive for - anxiety, disorientation, irritability and memory difficulties  negative for - hallucinations or hostility  Ophthalmic ROS: negative  Respiratory ROS: no cough, shortness of breath, or wheezing  Cardiovascular ROS: no chest pain or dyspnea on exertion  Gastrointestinal ROS: no abdominal pain, change in bowel habits, or black or bloody stools  Genito-Urinary ROS: no dysuria, trouble voiding, or hematuria  Musculoskeletal ROS: chronic pain   Neurological ROS: no TIA or stroke symptoms  Dermatological ROS: negative    OBJECTIVE      Medications      Current Facility-Administered Medications: dextrose 5 % and 0.45 % sodium chloride infusion, , IntraVENous, Continuous  morphine 20MG/ML concentrated solution 5 mg, 5 mg, Oral, Q2H PRN  LORazepam (ATIVAN) 2 MG/ML concentrated solution 1 mg, 1 mg, Oral, Q8H PRN  melatonin tablet 3 mg, 3 mg, Oral, Nightly PRN  traMADol (ULTRAM) tablet 50 mg, 50 mg, Oral, Q12H PRN  dextrose bolus 10% 125 mL, 125 mL, IntraVENous, PRN **OR** dextrose bolus 10% 250 mL, 250 mL, IntraVENous, PRN  glucagon (rDNA) injection 1 mg, 1 mg, IntraMUSCular, PRN  dextrose 5 % solution, 100 mL/hr, IntraVENous, PRN  insulin glargine (LANTUS) injection vial 10 Units, 0.15 Units/kg, SubCUTAneous, Nightly  insulin lispro (HUMALOG) injection vial 0-12 Units, 0-12 Units, SubCUTAneous, TID WC  insulin lispro (HUMALOG) injection vial 0-6 Units, 0-6 Units, SubCUTAneous, Nightly  heparin (porcine) injection 5,000 Units, 5,000 Units, SubCUTAneous, 3 times per day  acetaminophen (TYLENOL) tablet 650 mg, 650 mg, Oral, Q6H PRN  acetaminophen (TYLENOL) tablet 500 mg, 500 mg, Oral, Nightly  aspirin chewable tablet 81 mg, 81 mg, Oral, Daily  vitamin B-12 (CYANOCOBALAMIN) tablet 1,000 mcg, 1,000 mcg, Oral, Daily  docusate sodium (COLACE) capsule 200 mg, 200 mg, Oral, Nightly  donepezil (ARICEPT) tablet 10 mg, 10 mg, Oral, Nightly  finasteride (PROSCAR) tablet 5 mg, 5 mg, Oral, Daily  lidocaine (XYLOCAINE) 5 % ointment, , Topical, Q8H PRN  dermaphor ointment, , Topical, Nightly  therapeutic multivitamin-minerals 1 tablet, 1 tablet, Oral, Daily  polyvinyl alcohol (LIQUIFILM TEARS) 1.4 % ophthalmic solution 1 drop, 1 drop, Both Eyes, 4x Daily  senna (SENOKOT) tablet 8.6 mg, 1 tablet, Oral, Nightly  sertraline (ZOLOFT) tablet 100 mg, 100 mg, Oral, Daily  alogliptin (NESINA) tablet 6.25 mg, 6.25 mg, Oral, Daily  tamsulosin (FLOMAX) capsule 0.4 mg, 0.4 mg, Oral, Daily  vitamin D (CHOLECALCIFEROL) tablet 1,000 Units, 1,000 Units, Oral, Daily    Physical      Vitals: BP (!) 110/58   Pulse 66   Temp 97.9 °F (36.6 °C) (Axillary)   Resp 17   Ht 6' (1.829 m)   Wt 144 lb 13.5 oz (65.7 kg)   SpO2 100%   BMI 19.64 kg/m²   Temp: Temp: 97.9 °F (36.6 °C)  Max: Temp  Av.2 °F (36.8 °C)  Min: 97.9 °F (36.6 °C)  Max: 98.7 °F (37.1 °C)  Respiration range:  Resp  Av.3  Min: 16  Max: 17  Pulse Range:  Pulse  Av.2  Min: 62  Max: 67  Blood pressure range:  Systolic (11CRP), HFU:24 , Min:77 , LTX:529   , Diastolic (10YSQ), LNN:62, Min:48, Max:58    SpO2  Av.3 %  Min: 97 %  Max: 100 %  No intake or output data in the 24 hours ending 22 2250    Vent settings:  Pulse  Av.1  Min: 61  Max: 121  Resp  Av.3  Min: 12  Max: 31  SpO2  Av.6 %  Min: 95 %  Max: 100 %    CONSTITUTIONAL:  fatigued, alert, uncooperative, distracted, mild distress, appears stated age and thin  EYES:  Unremarkable   NECK:  No JVD  and supple, symmetrical, trachea midline  BACK:  symmetric  LUNGS:  no increased work of breathing, moderate air exchange, no retractions and no crackles or wheezing  CARDIOVASCULAR:  normal apical pulses, regular rate and rhythm, normal S1 and S2 and no S3  ABDOMEN:  Soft scaphoid BS +   MUSCULOSKELETAL:  No distal edema , has weak pulsations  NEUROLOGIC:  No acute deficit but has generalized extensive weakness Babinski absent   SKIN:  Warm dry and pale  and no bruising or bleeding    Data      Recent Results (from the past 96 hour(s))   Basic Metabolic Panel    Collection Time: 07/08/22  4:44 AM   Result Value Ref Range    Sodium 147 (H) 136 - 145 mmol/L    Potassium 3.8 3.5 - 5.1 mmol/L    Chloride 106 99 - 110 mmol/L    CO2 26 21 - 32 mmol/L    Anion Gap 15 3 - 16    Glucose 213 (H) 70 - 99 mg/dL    BUN 88 (HH) 7 - 20 mg/dL    CREATININE 2.9 (H) 0.8 - 1.3 mg/dL    GFR Non-African American 20 (A) >60    GFR  25 (A) >60    Calcium 9.5 8.3 - 10.6 mg/dL   Renal Function Panel    Collection Time: 07/08/22  4:44 AM   Result Value Ref Range    Sodium 150 (H) 136 - 145 mmol/L    Potassium 3.9 3.5 - 5.1 mmol/L    Chloride 109 99 - 110 mmol/L    CO2 27 21 - 32 mmol/L    Anion Gap 14 3 - 16    Glucose 204 (H) 70 - 99 mg/dL    BUN 89 (HH) 7 - 20 mg/dL    CREATININE 2.8 (H) 0.8 - 1.3 mg/dL    GFR Non-African American 21 (A) >60    GFR  26 (A) >60    Calcium 9.5 8.3 - 10.6 mg/dL    Phosphorus 3.6 2.5 - 4.9 mg/dL    Albumin 3.6 3.4 - 5.0 g/dL   Electrolytes urine random    Collection Time: 07/08/22 12:30 PM   Result Value Ref Range    Sodium, Ur 32 Not Established mmol/L    Potassium, Ur 37.5 Not Established mmol/L    Chloride <20 Not Established mmol/L   Creatinine, Random Urine    Collection Time: 07/08/22 12:30 PM   Result Value Ref Range    Creatinine, Ur 80.4 39.0 - 259.0 mg/dL   Urea nitrogen, urine    Collection Time: 07/08/22 12:30 PM   Result Value Ref Range    Urea Nitrogen, Ur 962.4 800.0 - 1666.0 mg/dL   Legionella antigen, urine    Collection Time: 07/08/22 12:30 PM    Specimen: Urine voided   Result Value Ref Range    L. pneumophila Serogp 1 Ur Ag       Presumptive Negative  No Legionella pneumophila serogroup 1 antigens detected. A negative result does not exclude infection with  Legionella pneumophila serogroup 1 nor does it rule out  other microbial-caused respiratory infections or  disease caused by other serogroups of  Legionella pneumophila.   Normal Range: Presumptive Negative     Renal Function Panel    Collection Time: 07/09/22  6:03 AM   Result Value Ref Range    Sodium 138 136 - 145 mmol/L    Potassium 3.9 3.5 - 5.1 mmol/L    Chloride 102 99 - 110 mmol/L    CO2 28 21 - 32 mmol/L    Anion Gap 8 3 - 16    Glucose 304 (H) 70 - 99 mg/dL    BUN 61 (H) 7 - 20 mg/dL    CREATININE 2.1 (H) 0.8 - 1.3 mg/dL    GFR Non-African American 29 (A) >60    GFR  36 (A) >60    Calcium 8.5 8.3 - 10.6 mg/dL    Phosphorus 1.9 (L) 2.5 - 4.9 mg/dL    Albumin 3.1 (L) 3.4 - 5.0 g/dL   POCT Glucose    Collection Time: 07/09/22 11:47 AM   Result Value Ref Range    POC Glucose 302 (H) 70 - 99 mg/dl    Performed on ACCU-CHEK    POCT Glucose    Collection Time: 07/09/22  4:40 PM   Result Value Ref Range    POC Glucose 174 (H) 70 - 99 mg/dl    Performed on ACCU-CHEK    POCT Glucose    Collection Time: 07/09/22  7:48 PM   Result Value Ref Range    POC Glucose 160 (H) 70 - 99 mg/dl    Performed on ACCU-CHEK    Hemoglobin A1c    Collection Time: 07/10/22  1:59 AM   Result Value Ref Range    Hemoglobin A1C 6.7 See comment %    eAG 145.6 mg/dL   POCT Glucose    Collection Time: 07/10/22  7:15 AM   Result Value Ref Range    POC Glucose 211 (H) 70 - 99 mg/dl    Performed on ACCU-CHEK    Renal Function Panel    Collection Time: 07/10/22  9:30 AM   Result Value Ref Range    Sodium 138 136 - 145 mmol/L    Potassium 3.7 3.5 - 5.1 mmol/L    Chloride 102 99 - 110 mmol/L    CO2 30 21 - 32 mmol/L    Anion Gap 6 3 - 16    Glucose 163 (H) 70 - 99 mg/dL    BUN 46 (H) 7 - 20 mg/dL    CREATININE 1.7 (H) 0.8 - 1.3 mg/dL    GFR Non- 38 (A) >60    GFR  45 (A) >60    Calcium 8.5 8.3 - 10.6 mg/dL    Phosphorus 1.6 (L) 2.5 - 4.9 mg/dL    Albumin 2.9 (L) 3.4 - 5.0 g/dL   POCT Glucose    Collection Time: 07/10/22 12:02 PM   Result Value Ref Range    POC Glucose 136 (H) 70 - 99 mg/dl    Performed on ACCU-CHEK    POCT Glucose    Collection Time: 07/10/22  4:37 PM   Result Value Ref Range    POC Glucose 104 (H) 70 - 99 mg/dl    Performed on ACCU-CHEK    POCT Glucose    Collection Time: 07/10/22  8:09 PM   Result Value Ref Range    POC Glucose 122 (H) 70 - 99 mg/dl    Performed on ACCU-CHEK    Renal Function Panel    Collection Time: 07/11/22  6:26 AM   Result Value Ref Range    Sodium 137 136 - 145 mmol/L    Potassium 4.0 3.5 - 5.1 mmol/L    Chloride 106 99 - 110 mmol/L    CO2 26 21 - 32 mmol/L    Anion Gap 5 3 - 16    Glucose 94 70 - 99 mg/dL    BUN 40 (H) 7 - 20 mg/dL    CREATININE 1.5 (H) 0.8 - 1.3 mg/dL    GFR Non- 43 (A) >60    GFR  53 (A) >60    Calcium 8.1 (L) 8.3 - 10.6 mg/dL    Phosphorus 2.2 (L) 2.5 - 4.9 mg/dL    Albumin 2.4 (L) 3.4 - 5.0 g/dL   POCT Glucose    Collection Time: 07/11/22  7:42 AM   Result Value Ref Range    POC Glucose 101 (H) 70 - 99 mg/dl    Performed on ACCU-CHEK    POCT Glucose    Collection Time: 07/11/22 12:15 PM   Result Value Ref Range    POC Glucose 121 (H) 70 - 99 mg/dl    Performed on ACCU-CHEK    POCT Glucose    Collection Time: 07/11/22  5:17 PM   Result Value Ref Range    POC Glucose 87 70 - 99 mg/dl    Performed on ACCU-CHEK    POCT Glucose    Collection Time: 07/11/22  8:16 PM   Result Value Ref Range    POC Glucose 127 (H) 70 - 99 mg/dl    Performed on [address] [place] [address] Problems           Last Modified POA    * (Principal) 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

## 2022-07-12 NOTE — DISCHARGE INSTR - COC
Continuity of Care Form    Patient Name: Tracy Huynh   :  1926  MRN:  [id number]    Admit date:  2022  Discharge date:  2022    Code Status Order: DNR-CC   Advance Directives:      Admitting Physician:  Radha Pendleton MD  PCP: Katty Pritchard MD    Discharging Nurse: [address] [place] [address] Unit/Room#: 6RV-1836/1010-59  Discharging Unit Phone Number: [phone]    Emergency Contact:   Extended Emergency Contact Information  Primary Emergency Contact: [facility] [address], [address] Phone: [phone]  Work Phone: [phone]  Mobile Phone: [phone]  Relation: Spouse  Secondary Emergency Contact: [address] [place] [address] Phone: [phone]  Relation: Step Child    Past Surgical History:  History reviewed. No pertinent surgical history. Immunization History: There is no immunization history on file for this patient.     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, on chronic dialysis (HCC) N17.9, N18.9, Z99.2    Failure to thrive (child) R62.51    Severe protein-calorie malnutrition ([facility] E43    Vascular dementia ([facility] F01.50    Elevated troponin R77.8    Elevated PSA R97.20    Hypotension I95.9       Isolation/Infection:   Isolation            No Isolation          Patient Infection Status       None to display            Nurse Assessment:  Last Vital Signs: BP (!) 95/50   Pulse 80   Temp 99.2 °F (37.3 °C) (Axillary)   Resp 17   Ht 6' (1.829 m)   Wt 144 lb 13.5 oz (65.7 kg)   SpO2 98%   BMI 19.64 kg/m²     Last documented pain score (0-10 scale): Pain Level: 0  Last Weight:   Wt Readings from Last 1 Encounters:   07/10/22 144 lb 13.5 oz (65.7 kg)     Mental Status:  disoriented and alert    IV Access:  - None    Nursing Mobility/ADLs:  Walking   Assisted  Transfer  Assisted  Bathing  Assisted  Dressing  Assisted  Toileting  Assisted  Feeding  Assisted  Med Admin  Assisted  Med Delivery   crushed with applesauce    Wound Care Documentation and Therapy:        Elimination:  Continence: Bowel: No  Bladder: No  Urinary Catheter: Removal Date 7/12/2022    Colostomy/Ileostomy/Ileal Conduit: No       Date of Last BM: 7/12/22    Intake/Output Summary (Last 24 hours) at 7/12/2022 1037  Last data filed at 7/12/2022 0659  Gross per 24 hour   Intake --   Output 400 ml   Net -400 ml     I/O last 3 completed shifts: In: 130 [P.O.:120; I.V.:10]  Out: 400 [Urine:400]    Safety Concerns:     Sundowners Sundrome, History of Falls (last 30 days), At Risk for Falls, and Aspiration Risk    Impairments/Disabilities:      Vision    Nutrition Therapy:  Current Nutrition Therapy:   - Oral Diet:  Carb Control 3 carbs/meal (1500kcals/day), Dysphagia 3 advanced, Renal, Low Fat, and Low Sodium (2gm)  - Oral Nutrition Supplement:  Diabetic  three times a day    Routes of Feeding: Oral  Liquids: Thin Liquids  Daily Fluid Restriction: no  Last Modified Barium Swallow with Video (Video Swallowing Test): not done    Treatments at the Time of Hospital Discharge:   Respiratory Treatments: SEE MAR  Oxygen Therapy:  is not on home oxygen therapy.   Ventilator:    - No ventilator support    Rehab Therapies: PALLIATIVE  Weight Bearing Status/Restrictions: No weight bearing restrictions  Other Medical Equipment (for information only, NOT a DME order):  wheelchair, walker, bedside commode, and hospital bed  Other Treatments: CHECK COCCYX WOUND DAILY, CLEANSE AS NEEDED, CHANGE DRESSING Q 3 days OR WHEN SOILED    Patient's personal belongings (please select all that are sent with patient):  None    RN SIGNATURE:  Electronically signed by Patience Rodriguez RN on 7/12/22 at 11:50 AM EDT    CASE MANAGEMENT/SOCIAL WORK SECTION    Inpatient Status Date: 07/07/2022    Readmission Risk Assessment Score:  Readmission Risk              Risk of Unplanned Readmission:  27           Discharging to Facility/ [address], [address]  Phone: [phone]  Fax: [phone]     [facility]        / signature: Electronically signed by Marcie Gusman RN on 7/12/22 at 11:56 AM EDT    PHYSICIAN SECTION    Prognosis: Poor    Condition at Discharge: Stable    Rehab Potential (if transferring to Rehab): Poor    Recommended Labs or Other Treatments After Discharge: hospice care family has agreed on hospice consult and DNR CC    Physician Certification: I certify the above information and transfer of Travis Martinez  is necessary for the continuing treatment of the diagnosis listed and that he requires Hospice for less 30 days.      Update Admission H&P: No change in H&P    PHYSICIAN SIGNATURE:  Electronically signed by Leonardo Marques MD on 7/12/22 at 10:38 AM EDT

## 2022-08-07 PROBLEM — R77.8 ELEVATED TROPONIN: Status: RESOLVED | Noted: 2022-07-08 | Resolved: 2022-08-07

## 2022-08-12 NOTE — DISCHARGE SUMMARY
[facility]                     [address] [facility], [address]                               DISCHARGE SUMMARY    PATIENT NAME: Rachel Garrido                     :        1926  MED REC NO:   [id number]                          ROOM:       6938  ACCOUNT NO:   [de-identified]                           ADMIT DATE: 2022  PROVIDER:     Janay Hui MD                  DISCHARGE DATE:  2022    FINAL DIAGNOSES:  1. Acute-on-chronic renal failure. 2.  Elevated troponin. 3.  Elevated PSA. 4.  Altered mental status. 5.  Metabolic encephalopathy. 6.  Severe protein-calorie malnutrition. 7.  Failure to thrive. 8.  Possibility of a presumed stroke. 9.  Lactic acidosis. 10.  Senile dementia. DISCHARGE MEDICATIONS:  1.  Melatonin 3 mg at bedtime. 2.  Morphine sulfate concentrated solution 0.25 mL every 2 hours p.r.n.  for pain. 3.  Tramadol 50 every 12 hours p.r.n. for pain. 4.  Lorazepam Intensol 0.5 mL every 8 hours p.r.n. for agitation. 5.  Insulin Lantus 10 units nightly. 6.  Humalog 0-12 units and sliding scale three times a day. HOSPITAL COURSE:  This elderly man came to the emergency room with a  history of altered mental status, poor oral intake, increased lethargy,  total disorientation. Vital signs were stable. Blood pressure was  102/56, temperature 97.4, respirations 16, heart rate was 121. Sodium  was 150, potassium 4.7, chloride 102, CO2 of 27, BUN 90, creatinine 3.4,  anion gap was 17. The patient treated with IV hydration. Urinalysis  shows no evidence of any gross infection. CT of the abdomen and pelvis  shows atrophy of the small vessel ischemia of the brain. No evidence of  obstructive uropathy. There was a large amount of stool _____ the  rectum, correlate with fecal impaction. Ultrasound of the kidney was  also ordered.   The patient was for palliative care only, was DNR comfort  care, kept comfortable.  _____ from palliative care nursing also was  involved. Finally she coordinated the care with [facility] and the  family was in agreement with just total comfort care only. The patient  was discharged in stable condition to palliative care and with case  management. The patient was discharged to [facility] under [facility] in stable condition.         Keron Murray MD    D: 08/11/2022 13:55:29       T: 08/11/2022 13:58:01     [email or web address]  Job#: [id number]     Doc#: [id number]    CC: